# Patient Record
Sex: FEMALE | Race: WHITE | Employment: FULL TIME | URBAN - METROPOLITAN AREA
[De-identification: names, ages, dates, MRNs, and addresses within clinical notes are randomized per-mention and may not be internally consistent; named-entity substitution may affect disease eponyms.]

---

## 2021-01-18 ENCOUNTER — OFFICE VISIT (OUTPATIENT)
Dept: SURGERY | Age: 56
End: 2021-01-18
Payer: COMMERCIAL

## 2021-01-18 ENCOUNTER — DOCUMENTATION ONLY (OUTPATIENT)
Dept: SURGERY | Age: 56
End: 2021-01-18

## 2021-01-18 VITALS
TEMPERATURE: 97.6 F | HEART RATE: 75 BPM | DIASTOLIC BLOOD PRESSURE: 88 MMHG | SYSTOLIC BLOOD PRESSURE: 133 MMHG | HEIGHT: 68 IN | WEIGHT: 244 LBS | BODY MASS INDEX: 36.98 KG/M2

## 2021-01-18 DIAGNOSIS — C50.212 MALIGNANT NEOPLASM OF UPPER-INNER QUADRANT OF LEFT BREAST IN FEMALE, ESTROGEN RECEPTOR POSITIVE (HCC): Primary | ICD-10-CM

## 2021-01-18 DIAGNOSIS — Z17.0 MALIGNANT NEOPLASM OF UPPER-INNER QUADRANT OF LEFT BREAST IN FEMALE, ESTROGEN RECEPTOR POSITIVE (HCC): Primary | ICD-10-CM

## 2021-01-18 PROCEDURE — 99205 OFFICE O/P NEW HI 60 MIN: CPT | Performed by: SURGERY

## 2021-01-18 PROCEDURE — 76642 ULTRASOUND BREAST LIMITED: CPT | Performed by: SURGERY

## 2021-01-18 NOTE — PATIENT INSTRUCTIONS
Learning About Breast Cancer Surgery  What is breast cancer surgery? Surgery is a key part of treatment for breast cancer. The type of surgery you have depends on the size, location, and type of the cancer. It also depends on your health and what is important to you. Your doctor may combine treatments. This is a common way to treat breast cancer. You may have surgery to remove all the cancer that can be seen. After surgery you may also need radiation, chemotherapy, or hormone therapy. These treatments get rid of any cancer cells that may be left. During the surgery, the doctor may remove lymph nodes from the armpit. The lymph nodes will be looked at under a microscope. This is used to check if cancer has spread from the breast into the lymph nodes. What surgeries are used? Lumpectomy   Lumpectomy removes the tumor in the breast. The incision is made close to the tumor. This shows common places where the cut is made. Simple mastectomy   Simple mastectomy removes the whole breast, including nipple and skin. This shows where the cut is often made. Nipple-sparing mastectomy with inframammary cut   Nipple-sparing mastectomy removes the whole breast but leaves the skin and nipple. This shows the cut in the curve under the breast (inframammary). Nipple-sparing mastectomy with lateral cut   Nipple-sparing mastectomy removes the whole breast but leaves the skin and nipple. This shows the cut from the nipple along the side of the breast toward the armpit (lateral). Nipple-sparing mastectomy with periareolar cut   Nipple-sparing mastectomy removes the whole breast but leaves the skin and nipple. This shows the cut around the top of the nipple and along the side of the breast toward the armpit (periareolar).     Skin-sparing mastectomy with periareolar cut   Skin-sparing mastectomy removes the whole breast and the nipple, but it keeps the skin that covers the breast. This shows the cut around the nipple (periareolar). Skin-sparing mastectomy with teardrop cut   Skin-sparing mastectomy removes the whole breast and the nipple, but it keeps the skin that covers the breast. This shows the cut around the nipple in a teardrop shape. Skin-sparing mastectomy with tennis racket cut   Skin-sparing mastectomy removes the whole breast and the nipple, but it keeps the skin that covers the breast. This shows the cut around the nipple and along the side of the breast toward the armpit (tennis racket shape). What can you expect after surgery? The amount of time you will need to recover depends on the type of surgery you had. It also depends on whether you need any more treatment. If you had a lumpectomy, you will probably look the same in a bra. But your breasts may not match in size or shape after surgery. This depends on the size of your breasts and how much tissue was removed. If you had a mastectomy or had a lot of your breast removed in a lumpectomy, you may want to consider breast reconstruction. This is surgery to create a new breast. This may be done at the same time as your breast surgery. Or it may be done later. Some women choose not to do reconstruction at all. You choose what feels right for you. No matter what kind of surgery you have, you will get information about your treatment. This includes how to prepare, what to expect, and what to do afterward. Follow-up care is a key part of your treatment and safety. Be sure to make and go to all appointments, and call your doctor if you are having problems. It's also a good idea to know your test results and keep a list of the medicines you take. Where can you learn more? Go to http://www.[employer].com/  Enter P020 in the search box to learn more about \"Learning About Breast Cancer Surgery. \"  Current as of: April 29, 2020               Content Version: 12.6  © 0498-5409 [employer].    Care instructions adapted under license by [employer] (which disclaims liability or warranty for this information). If you have questions about a medical condition or this instruction, always ask your healthcare professional. [address] any warranty or liability for your use of this information. Learning About Breast Cancer Treatments  Your Care Instructions     Breast cancer means abnormal cells grow out of control in one or both breasts. These cancer cells can spread from the breast to nearby lymph nodes and other tissues. They can also spread to other parts of the body. The type and stage of cancer you have is based on:  · Where in the breast the cancer started. · The genetics of those cancer cells. · How far cancer has spread within the breast, to nearby tissues, or to other organs. · What the cancer cells look like under a microscope. · Whether there is cancer in the nearby lymph nodes. Tests that help find the stage of your cancer can help choose the right treatment for you. These tests can include a breast biopsy, lymph node biopsy, blood tests, and X-rays. You may need other tests as well, such as a bone, CT, or MRI scan. Whether you have more tests depends on your symptoms and the stage of the cancer. When you find out that you have cancer, you may feel many emotions and may need some help coping. Seek out family, friends, and counselors for support. You also can do things at home to make yourself feel better while you go through treatment. Call the [employer] [facility] (0-424.345.7688) or visit its website at [employer] for more information. How is breast cancer treated? Your doctor may combine treatments. This is a common way to treat breast cancer. Treatment depends on what type and stage of cancer you have. You may have:  · Surgery to remove the cancer. · Radiation. This uses high-dose X-rays to kill cancer cells and shrink tumors. · Chemotherapy.  This uses medicine to kill cancer cells.  · Hormone therapy. This uses medicines such as tamoxifen. It limits the effect of the hormone estrogen. This hormone can help some types of breast cancer cells to grow. · Targeted therapy. This uses medicines such as trastuzumab (Herceptin) to help your immune system fight the cancer. What surgeries are done for breast cancer? Surgery is a key part of treatment for breast cancer. The main types of surgeries are:  · Breast-conserving surgery, such as lumpectomy. It removes the cancer in the breast and just enough tissue to make sure that all of the cancer was removed. · Surgery to remove the breast. This includes:  ? Total mastectomy. This removes the whole breast.  ? Nipple-sparing mastectomy. This removes the whole breast but leaves the nipple. ? Modified radical mastectomy. This removes the whole breast and the lymph nodes under the arm (axillary lymph nodes). ? Radical mastectomy. This removes the whole breast, the chest muscles, and all the lymph nodes under the arm. If lymph nodes under the arm are removed, they are looked at under the microscope to check for cancer. There are two types of lymph node removal:  · [place] lymph node biopsy removes the lymph node that is the first to receive lymph fluid from the tumor. If cancer has spread from the breast to the lymph nodes, cancer cells most often show up in the sentinel node first.  · Axillary lymph node dissection removes most of the lymph nodes in the armpit. The type of surgery you have depends on the size, location, and type of the cancer. It also depends on your overall health and personal preferences. Even if your doctor removes all the cancer that can be seen at the time of your surgery, you may still need more treatment. You may get radiation, chemotherapy, or hormone therapy after surgery to try to kill any cancer cells that may be left.   No matter what kind of surgery you have, you will get information about why you are having it, what its risks are, how to prepare, and what to do after surgery. Follow-up care is a key part of your treatment and safety. Be sure to make and go to all appointments, and call your doctor if you are having problems. It's also a good idea to know your test results and keep a list of the medicines you take. Where can you learn more? Go to http://www.gray.com/  Enter X810 in the search box to learn more about \"Learning About Breast Cancer Treatments. \"  Current as of: April 29, 2020               Content Version: 12.6  © 8838-2924 [employer]. Care instructions adapted under license by [employer] (which disclaims liability or warranty for this information). If you have questions about a medical condition or this instruction, always ask your healthcare professional. [address] any warranty or liability for your use of this information.

## 2021-01-18 NOTE — PROGRESS NOTES
Brought outside breast imaging CDs ([phone] [facility]) up to [facility] [address] to be uploaded into pacs.

## 2021-01-18 NOTE — LETTER
1/19/2021 Patient: Chaitanya Barba YOB: 1965 Date of Visit: 1/18/2021 Suzie Tran MD 
[address] 
[address] [address] Via Fax: [phone] Dear Suzie Tran MD, Thank you for referring Ms. Marlena Rojas to [address] MAIN OFFICE [address] [place] [address] for evaluation. My notes for this consultation are attached. If you have questions, please do not hesitate to call me. I look forward to following your patient along with you. Sincerely, Zoe Feliz MD

## 2021-01-18 NOTE — PROGRESS NOTES
HISTORY OF PRESENT ILLNESS  Bay Jeff is a 60 y.o. female. HPI NEW Patient presents for consultation at the request of Dr. Daren Vargas for newly diagnosed LEFT breast cancer. This was detected from mammogram which led to diagnostic imaging and subsequent biopsies. She had 2 sites biopsied in left breast. One site showed cancer. History of left breast biopsies done in the past. All benign up until now. Some soreness in left breast.    Breast cancer-   - LEFT breast asymmetry 9 OC biopsy revealed invasive mammary carcinoma, ductal subtype. Also LEFT breast mass 9 OC 8 cmfn biopsy was benign adenosis. Family history-  Maternal grandmother had breast cancer in her 42's. Maternal aunt had breast cancer in her 42's. Sister - questionable breast cancer? Age 48  Niece breast breast cancer age 40  Paternal cousin had breast cancer age 61. Niece had ovarian cancer age 28. Mother had cervical cancer age 22.     Breast imaging-  2020 - left breast dx reyes and US done at [address]: BI-RADS 4 c   2020 - screening mammogram at [address]: BI-RADS 0  Past Medical History:   Diagnosis Date    Back pain     Chronic pain     BACK/JOINT PAIN    Constipation     Dyspepsia and other specified disorders of function of stomach     Gallstones 2014    GERD (gastroesophageal reflux disease)     INTERMITTANT    Joint pain     Nausea & vomiting      Past Surgical History:   Procedure Laterality Date    HX APPENDECTOMY  1985    HX CHOLECYSTECTOMY  5-15-14    [id number]         HX HEENT      tonsillectomy    HX ORTHOPAEDIC      meniscus tear right knee    HX OTHER SURGICAL      removal of cyst from head    DE BREAST SURGERY PROCEDURE UNLISTED      BREAST BIOPSIES     Social History     Socioeconomic History    Marital status:      Spouse name: Not on file    Number of children: Not on file    Years of education: Not on file    Highest education level: Not on file   Occupational History    Not on file   Social Needs    Financial resource strain: Not on file    Food insecurity     Worry: Not on file     Inability: Not on file    Transportation needs     Medical: Not on file     Non-medical: Not on file   Tobacco Use    Smoking status: Never Smoker    Smokeless tobacco: Never Used   Substance and Sexual Activity    Alcohol use: Yes     Comment: wine/beer 2 per month    Drug use: Not on file    Sexual activity: Not on file   Lifestyle    Physical activity     Days per week: Not on file     Minutes per session: Not on file    Stress: Not on file   Relationships    Social connections     Talks on phone: Not on file     Gets together: Not on file     Attends [employer] service: Not on file     Active member of club or organization: Not on file     Attends meetings of clubs or organizations: Not on file     Relationship status: Not on file    Intimate partner violence     Fear of current or ex partner: Not on file     Emotionally abused: Not on file     Physically abused: Not on file     Forced sexual activity: Not on file   Other Topics Concern    Not on file   Social History Narrative    Not on file     OB History    No obstetric history on file. Obstetric Comments   Menarche 11, LMP 2017, # of children 2, age of 4st delivery 19, Hysterectomy/oophorectomy NO/NO, Breast bx YES, history of breast feeding YES, BCP YES, Hormone therapy NO             Current Outpatient Medications:     estradiol-norethindrone (COMBIPATCH) 0.05-0.14 mg/24 hr, 1 Patch by TransDERmal route two (2) times a week., Disp: , Rfl:     meloxicam (MOBIC) 15 mg tablet, Take 15 mg by mouth daily. , Disp: , Rfl:     solifenacin (VESICARE) 5 mg tablet, Take 5 mg by mouth daily. , Disp: , Rfl:     ETONOGESTREL/ETHINYL ESTRADIOL (NUVARING VA), Insert  into vagina. , Disp: , Rfl:   No Known Allergies  Review of Systems   Constitutional: Negative. HENT: Negative.     Eyes: Negative. Respiratory: Negative. Cardiovascular: Negative. Gastrointestinal: Negative. Genitourinary: Negative. Musculoskeletal: Negative. Skin: Negative. Neurological: Negative. Endo/Heme/Allergies: Negative. Psychiatric/Behavioral: Negative. All other systems reviewed and are negative. Physical Exam  Vitals signs and nursing note reviewed. Constitutional:       Appearance: She is well-developed. HENT:      Head: Normocephalic. Neck:      Musculoskeletal: Neck supple. Thyroid: No thyromegaly. Cardiovascular:      Rate and Rhythm: Normal rate and regular rhythm. Heart sounds: Normal heart sounds. Pulmonary:      Effort: Pulmonary effort is normal.      Breath sounds: Normal breath sounds. Chest:      Breasts: Breasts are symmetrical.         Right: No inverted nipple, mass, nipple discharge, skin change or tenderness. Left: No inverted nipple, mass, nipple discharge, skin change or tenderness. Abdominal:      Palpations: Abdomen is soft. Musculoskeletal: Normal range of motion. Lymphadenopathy:      Cervical: No cervical adenopathy. Skin:     General: Skin is warm and dry. Neurological:      Mental Status: She is alert and oriented to person, place, and time. BREAST ULTRASOUND, Preop planning  Indication:preop planning  left Breast 9:00   Technique: The area was scanned using a high-frequency linear-array near-field transducer  Findings: clip not seen  Impression: Biopsy site not visible with ultrasound  Disposition:  Will schedule lumpectomy with sentinel lymph node biopsy after mri    ASSESSMENT and PLAN    ICD-10-CM ICD-9-CM    1. Malignant neoplasm of upper-inner quadrant of left breast in female, estrogen receptor positive (HCC)  C50.212 174.2 MRI BREAST BI W WO CONT    Z17.0 V86.0      59 yo female with left breast T1 N0 IDC receptors pending. She is here with her .    I have reviewed the imaging and pathology with her and she was given copies of these reports. 90 minutes were spent face-to-face with the patient during this encounter and 90% of that time was spent on counseling and coordination of care. 1. Discussed lumpectomy and radiation vs mastectomy. Discussed reconstruction. MRI ordered to see if patient is a candidate for a lumpectomy. Discussed reduction lumpectomy. Discussed magseed localization. 2. Discussed sentinel lymph node biopsy. 3. Discussed external beam radiation. 4. Discussed hormone therapy. 5. Discussed the possibility of chemotherapy. Will refer to plastics and check breast mri. She is interested in reduction lumpectomy. I will call her after mri. Will need magseed for localization.

## 2021-01-27 ENCOUNTER — HOSPITAL ENCOUNTER (OUTPATIENT)
Dept: MRI IMAGING | Age: 56
Discharge: HOME | End: 2021-01-27
Attending: SURGERY
Payer: COMMERCIAL

## 2021-01-27 DIAGNOSIS — C50.212 MALIGNANT NEOPLASM OF UPPER-INNER QUADRANT OF LEFT BREAST IN FEMALE, ESTROGEN RECEPTOR POSITIVE (HCC): ICD-10-CM

## 2021-01-27 DIAGNOSIS — Z17.0 MALIGNANT NEOPLASM OF UPPER-INNER QUADRANT OF LEFT BREAST IN FEMALE, ESTROGEN RECEPTOR POSITIVE (HCC): ICD-10-CM

## 2021-01-27 PROCEDURE — A9585 GADOBUTROL INJECTION: HCPCS | Performed by: SURGERY

## 2021-01-27 PROCEDURE — 74011250636 HC RX REV CODE- 250/636: Performed by: SURGERY

## 2021-01-27 PROCEDURE — 77049 MRI BREAST C-+ W/CAD BI: CPT

## 2021-01-27 RX ADMIN — GADOBUTROL 10 ML: 604.72 INJECTION INTRAVENOUS at 13:47

## 2021-01-28 NOTE — PROGRESS NOTES
I called patient with mri   Amenable to lumpectomy, sln biopsy   hasnt heard from dr. Kirill Montero office  Will have ho follow up wants reductions.

## 2021-01-29 ENCOUNTER — DOCUMENTATION ONLY (OUTPATIENT)
Dept: SURGERY | Age: 56
End: 2021-01-29

## 2021-01-29 NOTE — PROGRESS NOTES
I called Dr Kelly Last office on 01/25/2021 the appointment was not scheduled yet. I called again today and the  said the  would be calling me back today. I will try and call again before I leave today.

## 2021-02-02 DIAGNOSIS — C50.212 MALIGNANT NEOPLASM OF UPPER-INNER QUADRANT OF LEFT BREAST IN FEMALE, ESTROGEN RECEPTOR POSITIVE (HCC): Primary | ICD-10-CM

## 2021-02-02 DIAGNOSIS — Z17.0 MALIGNANT NEOPLASM OF UPPER-INNER QUADRANT OF LEFT BREAST IN FEMALE, ESTROGEN RECEPTOR POSITIVE (HCC): Primary | ICD-10-CM

## 2021-02-02 NOTE — PROGRESS NOTES
Dr Cecil Neil appointment is 02/10/2021 @ 4:30 pm  Patient has been called and has this information.

## 2021-02-08 ENCOUNTER — TELEPHONE (OUTPATIENT)
Dept: SURGERY | Age: 56
End: 2021-02-08

## 2021-02-08 NOTE — TELEPHONE ENCOUNTER
Called and spoke with patient regarding genetic testing results - clinically negative. She has appointment with plastic surgery this week. She asked about the STD form she sent via [address] I let her know that we received it.

## 2021-02-08 NOTE — TELEPHONE ENCOUNTER
Copying Fabrizio Dumont and Deep Tsang to this message because patient was asking about short term disability.   Thanks,  Franca Rose

## 2021-02-19 ENCOUNTER — DOCUMENTATION ONLY (OUTPATIENT)
Dept: SURGERY | Age: 56
End: 2021-02-19

## 2021-02-19 NOTE — PROGRESS NOTES
Spoke with Harvey Blanco in Dr Lai Humphreys office. Will sent request to Dr Johnna Cedillo for surgery order for this patient.

## 2021-03-11 ENCOUNTER — HOSPITAL ENCOUNTER (OUTPATIENT)
Dept: PREADMISSION TESTING | Age: 56
Discharge: HOME | End: 2021-03-11
Payer: COMMERCIAL

## 2021-03-11 VITALS
BODY MASS INDEX: 38.72 KG/M2 | SYSTOLIC BLOOD PRESSURE: 138 MMHG | HEIGHT: 68 IN | WEIGHT: 255.51 LBS | RESPIRATION RATE: 18 BRPM | TEMPERATURE: 98.2 F | DIASTOLIC BLOOD PRESSURE: 85 MMHG | HEART RATE: 87 BPM

## 2021-03-11 LAB
BASOPHILS # BLD: 0 K/UL (ref 0–0.1)
BASOPHILS NFR BLD: 0 % (ref 0–1)
DIFFERENTIAL METHOD BLD: NORMAL
EOSINOPHIL # BLD: 0.2 K/UL (ref 0–0.4)
EOSINOPHIL NFR BLD: 4 % (ref 0–7)
ERYTHROCYTE [DISTWIDTH] IN BLOOD BY AUTOMATED COUNT: 13.7 % (ref 11.5–14.5)
HCT VFR BLD AUTO: 36.5 % (ref 35–47)
HGB BLD-MCNC: 12.3 G/DL (ref 11.5–16)
IMM GRANULOCYTES # BLD AUTO: 0 K/UL (ref 0–0.04)
IMM GRANULOCYTES NFR BLD AUTO: 0 % (ref 0–0.5)
LYMPHOCYTES # BLD: 1.3 K/UL (ref 0.8–3.5)
LYMPHOCYTES NFR BLD: 24 % (ref 12–49)
MCH RBC QN AUTO: 30.4 PG (ref 26–34)
MCHC RBC AUTO-ENTMCNC: 33.7 G/DL (ref 30–36.5)
MCV RBC AUTO: 90.3 FL (ref 80–99)
MONOCYTES # BLD: 0.5 K/UL (ref 0–1)
MONOCYTES NFR BLD: 9 % (ref 5–13)
NEUTS SEG # BLD: 3.3 K/UL (ref 1.8–8)
NEUTS SEG NFR BLD: 63 % (ref 32–75)
NRBC # BLD: 0 K/UL (ref 0–0.01)
NRBC BLD-RTO: 0 PER 100 WBC
PLATELET # BLD AUTO: 245 K/UL (ref 150–400)
PMV BLD AUTO: 9.9 FL (ref 8.9–12.9)
RBC # BLD AUTO: 4.04 M/UL (ref 3.8–5.2)
WBC # BLD AUTO: 5.4 K/UL (ref 3.6–11)

## 2021-03-11 PROCEDURE — 85025 COMPLETE CBC W/AUTO DIFF WBC: CPT

## 2021-03-11 PROCEDURE — 36415 COLL VENOUS BLD VENIPUNCTURE: CPT

## 2021-03-11 RX ORDER — BISMUTH SUBSALICYLATE 262 MG
1 TABLET,CHEWABLE ORAL DAILY
COMMUNITY

## 2021-03-11 NOTE — PERIOP NOTES
PRE-OPERATIVE INSTRUCTIONS REVIEWED WITH PATIENT. PT GIVEN TIME TO ASK QUESTIONS   PATIENT GIVEN 2-BOTTLE OF CHG SOAP. REVIEWED. PATIENT GIVEN SSI INFECTION FAQ SHEET.     INSTRUCTIONS GIVEN AND REVIEWED ON ADMISSION PROCESS AND COVID PT UNDERSTANDS THE NEED TO SELF QUARANTINE UNTIL DOS

## 2021-03-17 ENCOUNTER — IMMUNIZATION (OUTPATIENT)
Dept: INTERNAL MEDICINE CLINIC | Age: 56
End: 2021-03-17
Payer: COMMERCIAL

## 2021-03-17 DIAGNOSIS — Z23 ENCOUNTER FOR IMMUNIZATION: Primary | ICD-10-CM

## 2021-03-17 PROCEDURE — 91300 COVID-19, MRNA, LNP-S, PF, 30MCG/0.3ML DOSE(PFIZER): CPT | Performed by: FAMILY MEDICINE

## 2021-03-17 PROCEDURE — 0001A COVID-19, MRNA, LNP-S, PF, 30MCG/0.3ML DOSE(PFIZER): CPT | Performed by: FAMILY MEDICINE

## 2021-03-23 DIAGNOSIS — C50.212 MALIGNANT NEOPLASM OF UPPER-INNER QUADRANT OF LEFT BREAST IN FEMALE, ESTROGEN RECEPTOR POSITIVE (HCC): Primary | ICD-10-CM

## 2021-03-23 DIAGNOSIS — Z17.0 MALIGNANT NEOPLASM OF UPPER-INNER QUADRANT OF LEFT BREAST IN FEMALE, ESTROGEN RECEPTOR POSITIVE (HCC): Primary | ICD-10-CM

## 2021-03-24 ENCOUNTER — HOSPITAL ENCOUNTER (OUTPATIENT)
Dept: MAMMOGRAPHY | Age: 56
Discharge: HOME | End: 2021-03-24
Attending: SURGERY
Payer: COMMERCIAL

## 2021-03-24 DIAGNOSIS — R92.8 ABNORMAL MAMMOGRAM: ICD-10-CM

## 2021-03-24 PROCEDURE — A4648 IMPLANTABLE TISSUE MARKER: HCPCS

## 2021-03-24 PROCEDURE — 74011000250 HC RX REV CODE- 250: Performed by: RADIOLOGY

## 2021-03-24 RX ORDER — LIDOCAINE HYDROCHLORIDE 10 MG/ML
5 INJECTION INFILTRATION; PERINEURAL
Status: COMPLETED | OUTPATIENT
Start: 2021-03-24 | End: 2021-03-24

## 2021-03-24 RX ADMIN — LIDOCAINE HYDROCHLORIDE 5 ML: 10 INJECTION, SOLUTION INFILTRATION; PERINEURAL at 11:50

## 2021-03-24 NOTE — PROGRESS NOTES
Patient tolerated left breast mag seed placement well with scant bleeding. Site was covered with a gauze and tegaderm. Discharge instructions were reviewed with the patient and she was provided with a written copy as well. Encouraged her to call with any questions or concerns.

## 2021-03-25 NOTE — PROGRESS NOTES
[facility] [phone] [facility]. I gave a copy to the  to scan into patient's chart, along with her return to work form, to be sent at a later date.

## 2021-03-26 ENCOUNTER — HOSPITAL ENCOUNTER (OUTPATIENT)
Dept: PREADMISSION TESTING | Age: 56
Discharge: HOME | End: 2021-03-26
Payer: COMMERCIAL

## 2021-03-26 ENCOUNTER — TRANSCRIBE ORDER (OUTPATIENT)
Dept: REGISTRATION | Age: 56
End: 2021-03-26

## 2021-03-26 DIAGNOSIS — C50.212 MALIGNANT NEOPLASM OF UPPER-INNER QUADRANT OF LEFT BREAST IN FEMALE, ESTROGEN RECEPTOR POSITIVE (HCC): ICD-10-CM

## 2021-03-26 DIAGNOSIS — Z01.812 PRE-PROCEDURE LAB EXAM: Primary | ICD-10-CM

## 2021-03-26 DIAGNOSIS — Z17.0 MALIGNANT NEOPLASM OF UPPER-INNER QUADRANT OF LEFT BREAST IN FEMALE, ESTROGEN RECEPTOR POSITIVE (HCC): ICD-10-CM

## 2021-03-26 PROCEDURE — U0003 INFECTIOUS AGENT DETECTION BY NUCLEIC ACID (DNA OR RNA); SEVERE ACUTE RESPIRATORY SYNDROME CORONAVIRUS 2 (SARS-COV-2) (CORONAVIRUS DISEASE [COVID-19]), AMPLIFIED PROBE TECHNIQUE, MAKING USE OF HIGH THROUGHPUT TECHNOLOGIES AS DESCRIBED BY CMS-2020-01-R: HCPCS

## 2021-03-27 LAB — SARS-COV-2, COV2NT: NOT DETECTED

## 2021-03-29 ENCOUNTER — ANESTHESIA EVENT (OUTPATIENT)
Dept: MEDSURG UNIT | Age: 56
End: 2021-03-29
Payer: COMMERCIAL

## 2021-03-30 ENCOUNTER — APPOINTMENT (OUTPATIENT)
Dept: NUCLEAR MEDICINE | Age: 56
End: 2021-03-30
Attending: SURGERY
Payer: COMMERCIAL

## 2021-03-30 ENCOUNTER — APPOINTMENT (OUTPATIENT)
Dept: MAMMOGRAPHY | Age: 56
End: 2021-03-30
Attending: SURGERY
Payer: COMMERCIAL

## 2021-03-30 ENCOUNTER — ANESTHESIA (OUTPATIENT)
Dept: MEDSURG UNIT | Age: 56
End: 2021-03-30
Payer: COMMERCIAL

## 2021-03-30 ENCOUNTER — HOSPITAL ENCOUNTER (OUTPATIENT)
Age: 56
Setting detail: OUTPATIENT SURGERY
Discharge: HOME | End: 2021-03-30
Attending: SURGERY | Admitting: SURGERY
Payer: COMMERCIAL

## 2021-03-30 VITALS
WEIGHT: 249 LBS | HEART RATE: 94 BPM | DIASTOLIC BLOOD PRESSURE: 83 MMHG | BODY MASS INDEX: 37.74 KG/M2 | OXYGEN SATURATION: 93 % | RESPIRATION RATE: 16 BRPM | HEIGHT: 68 IN | TEMPERATURE: 98.3 F | SYSTOLIC BLOOD PRESSURE: 136 MMHG

## 2021-03-30 DIAGNOSIS — C50.212 MALIGNANT NEOPLASM OF UPPER-INNER QUADRANT OF LEFT BREAST IN FEMALE, ESTROGEN RECEPTOR POSITIVE (HCC): ICD-10-CM

## 2021-03-30 DIAGNOSIS — Z17.0 MALIGNANT NEOPLASM OF UPPER-INNER QUADRANT OF LEFT BREAST IN FEMALE, ESTROGEN RECEPTOR POSITIVE (HCC): ICD-10-CM

## 2021-03-30 PROCEDURE — 77030026438 HC STYL ET INTUB CARD -A: Performed by: REGISTERED NURSE

## 2021-03-30 PROCEDURE — 2709999900 HC NON-CHARGEABLE SUPPLY: Performed by: SURGERY

## 2021-03-30 PROCEDURE — 88305 TISSUE EXAM BY PATHOLOGIST: CPT

## 2021-03-30 PROCEDURE — 77030034556 HC PRB MARGN DETECT LUMPECTMY DISP DUNE -G: Performed by: SURGERY

## 2021-03-30 PROCEDURE — 77030041680 HC PNCL ELECSURG SMK EVAC CNMD -B: Performed by: SURGERY

## 2021-03-30 PROCEDURE — 88307 TISSUE EXAM BY PATHOLOGIST: CPT

## 2021-03-30 PROCEDURE — 76060000066 HC AMB SURG ANES 3 TO 3.5 HR: Performed by: SURGERY

## 2021-03-30 PROCEDURE — 74011250636 HC RX REV CODE- 250/636: Performed by: REGISTERED NURSE

## 2021-03-30 PROCEDURE — 76030000006 HC AMB SURG OR TIME 3 TO 3.5: Performed by: SURGERY

## 2021-03-30 PROCEDURE — 77030034626 HC LIGASURE SM JAW SEAL OPN SURG COVD -E: Performed by: SURGERY

## 2021-03-30 PROCEDURE — 76210000037 HC AMBSU PH I REC 2 TO 2.5 HR: Performed by: SURGERY

## 2021-03-30 PROCEDURE — 74011000250 HC RX REV CODE- 250: Performed by: REGISTERED NURSE

## 2021-03-30 PROCEDURE — 77030010507 HC ADH SKN DERMBND J&J -B: Performed by: SURGERY

## 2021-03-30 PROCEDURE — 74011250637 HC RX REV CODE- 250/637: Performed by: ANESTHESIOLOGY

## 2021-03-30 PROCEDURE — 77030040506 HC DRN WND MDII -A: Performed by: SURGERY

## 2021-03-30 PROCEDURE — 77030040361 HC SLV COMPR DVT MDII -B: Performed by: SURGERY

## 2021-03-30 PROCEDURE — 77030038213 HC SUPP BRA COMPRSS PSTOP COND -B: Performed by: SURGERY

## 2021-03-30 PROCEDURE — 77030041528 HC RETCTR RADLUX LGHTD MEDT -C: Performed by: SURGERY

## 2021-03-30 PROCEDURE — 74011000258 HC RX REV CODE- 258: Performed by: REGISTERED NURSE

## 2021-03-30 PROCEDURE — 74011000250 HC RX REV CODE- 250: Performed by: SURGERY

## 2021-03-30 PROCEDURE — 74011000250 HC RX REV CODE- 250: Performed by: PLASTIC SURGERY

## 2021-03-30 PROCEDURE — 19301 PARTIAL MASTECTOMY: CPT | Performed by: SURGERY

## 2021-03-30 PROCEDURE — 77030002966 HC SUT PDS J&J -A: Performed by: SURGERY

## 2021-03-30 PROCEDURE — 77030011267 HC ELECTRD BLD COVD -A: Performed by: SURGERY

## 2021-03-30 PROCEDURE — 38525 BIOPSY/REMOVAL LYMPH NODES: CPT | Performed by: SURGERY

## 2021-03-30 PROCEDURE — 77030008462 HC STPLR SKN PROX J&J -A: Performed by: SURGERY

## 2021-03-30 PROCEDURE — 77030012407 HC DRN WND BARD -B: Performed by: SURGERY

## 2021-03-30 PROCEDURE — 77030019702 HC WRP THER MENM -C: Performed by: SURGERY

## 2021-03-30 PROCEDURE — 74011250636 HC RX REV CODE- 250/636: Performed by: PLASTIC SURGERY

## 2021-03-30 PROCEDURE — 78195 LYMPH SYSTEM IMAGING: CPT

## 2021-03-30 PROCEDURE — 74011250636 HC RX REV CODE- 250/636: Performed by: ANESTHESIOLOGY

## 2021-03-30 PROCEDURE — 77030002996 HC SUT SLK J&J -A: Performed by: SURGERY

## 2021-03-30 PROCEDURE — 77030002933 HC SUT MCRYL J&J -A: Performed by: SURGERY

## 2021-03-30 PROCEDURE — 77030008684 HC TU ET CUF COVD -B: Performed by: REGISTERED NURSE

## 2021-03-30 RX ORDER — ONDANSETRON 2 MG/ML
4 INJECTION INTRAMUSCULAR; INTRAVENOUS AS NEEDED
Status: DISCONTINUED | OUTPATIENT
Start: 2021-03-30 | End: 2021-03-30 | Stop reason: HOSPADM

## 2021-03-30 RX ORDER — ROCURONIUM BROMIDE 10 MG/ML
INJECTION, SOLUTION INTRAVENOUS AS NEEDED
Status: DISCONTINUED | OUTPATIENT
Start: 2021-03-30 | End: 2021-03-30 | Stop reason: HOSPADM

## 2021-03-30 RX ORDER — HYDROMORPHONE HYDROCHLORIDE 1 MG/ML
0.2 INJECTION, SOLUTION INTRAMUSCULAR; INTRAVENOUS; SUBCUTANEOUS
Status: DISCONTINUED | OUTPATIENT
Start: 2021-03-30 | End: 2021-03-30 | Stop reason: HOSPADM

## 2021-03-30 RX ORDER — GLYCOPYRROLATE 0.2 MG/ML
INJECTION INTRAMUSCULAR; INTRAVENOUS AS NEEDED
Status: DISCONTINUED | OUTPATIENT
Start: 2021-03-30 | End: 2021-03-30 | Stop reason: HOSPADM

## 2021-03-30 RX ORDER — LIDOCAINE HYDROCHLORIDE 10 MG/ML
0.1 INJECTION, SOLUTION EPIDURAL; INFILTRATION; INTRACAUDAL; PERINEURAL AS NEEDED
Status: DISCONTINUED | OUTPATIENT
Start: 2021-03-30 | End: 2021-03-30 | Stop reason: HOSPADM

## 2021-03-30 RX ORDER — EPHEDRINE SULFATE/0.9% NACL/PF 50 MG/5 ML
SYRINGE (ML) INTRAVENOUS AS NEEDED
Status: DISCONTINUED | OUTPATIENT
Start: 2021-03-30 | End: 2021-03-30 | Stop reason: HOSPADM

## 2021-03-30 RX ORDER — BUPIVACAINE HYDROCHLORIDE 2.5 MG/ML
INJECTION, SOLUTION EPIDURAL; INFILTRATION; INTRACAUDAL AS NEEDED
Status: DISCONTINUED | OUTPATIENT
Start: 2021-03-30 | End: 2021-03-30 | Stop reason: HOSPADM

## 2021-03-30 RX ORDER — LIDOCAINE HYDROCHLORIDE 20 MG/ML
INJECTION, SOLUTION EPIDURAL; INFILTRATION; INTRACAUDAL; PERINEURAL AS NEEDED
Status: DISCONTINUED | OUTPATIENT
Start: 2021-03-30 | End: 2021-03-30 | Stop reason: HOSPADM

## 2021-03-30 RX ORDER — ONDANSETRON 2 MG/ML
INJECTION INTRAMUSCULAR; INTRAVENOUS AS NEEDED
Status: DISCONTINUED | OUTPATIENT
Start: 2021-03-30 | End: 2021-03-30 | Stop reason: HOSPADM

## 2021-03-30 RX ORDER — CEFAZOLIN SODIUM 1 G/3ML
INJECTION, POWDER, FOR SOLUTION INTRAMUSCULAR; INTRAVENOUS AS NEEDED
Status: DISCONTINUED | OUTPATIENT
Start: 2021-03-30 | End: 2021-03-30 | Stop reason: HOSPADM

## 2021-03-30 RX ORDER — CEPHALEXIN 250 MG/1
500 CAPSULE ORAL 3 TIMES DAILY
Qty: 42 CAP | Refills: 0 | Status: SHIPPED | OUTPATIENT
Start: 2021-03-30 | End: 2021-04-06

## 2021-03-30 RX ORDER — SODIUM CHLORIDE 0.9 % (FLUSH) 0.9 %
5-40 SYRINGE (ML) INJECTION AS NEEDED
Status: DISCONTINUED | OUTPATIENT
Start: 2021-03-30 | End: 2021-03-30 | Stop reason: HOSPADM

## 2021-03-30 RX ORDER — SODIUM CHLORIDE 0.9 % (FLUSH) 0.9 %
5-40 SYRINGE (ML) INJECTION EVERY 8 HOURS
Status: DISCONTINUED | OUTPATIENT
Start: 2021-03-30 | End: 2021-03-30 | Stop reason: HOSPADM

## 2021-03-30 RX ORDER — HYDROMORPHONE HYDROCHLORIDE 2 MG/ML
INJECTION, SOLUTION INTRAMUSCULAR; INTRAVENOUS; SUBCUTANEOUS AS NEEDED
Status: DISCONTINUED | OUTPATIENT
Start: 2021-03-30 | End: 2021-03-30 | Stop reason: HOSPADM

## 2021-03-30 RX ORDER — OXYCODONE AND ACETAMINOPHEN 5; 325 MG/1; MG/1
1-2 TABLET ORAL
Qty: 40 TAB | Refills: 0 | Status: SHIPPED | OUTPATIENT
Start: 2021-03-30 | End: 2021-04-02

## 2021-03-30 RX ORDER — SUCCINYLCHOLINE CHLORIDE 20 MG/ML
INJECTION INTRAMUSCULAR; INTRAVENOUS AS NEEDED
Status: DISCONTINUED | OUTPATIENT
Start: 2021-03-30 | End: 2021-03-30 | Stop reason: HOSPADM

## 2021-03-30 RX ORDER — SODIUM CHLORIDE, SODIUM LACTATE, POTASSIUM CHLORIDE, CALCIUM CHLORIDE 600; 310; 30; 20 MG/100ML; MG/100ML; MG/100ML; MG/100ML
INJECTION, SOLUTION INTRAVENOUS
Status: DISCONTINUED | OUTPATIENT
Start: 2021-03-30 | End: 2021-03-30 | Stop reason: HOSPADM

## 2021-03-30 RX ORDER — MIDAZOLAM HYDROCHLORIDE 1 MG/ML
INJECTION, SOLUTION INTRAMUSCULAR; INTRAVENOUS AS NEEDED
Status: DISCONTINUED | OUTPATIENT
Start: 2021-03-30 | End: 2021-03-30 | Stop reason: HOSPADM

## 2021-03-30 RX ORDER — MORPHINE SULFATE 2 MG/ML
2 INJECTION, SOLUTION INTRAMUSCULAR; INTRAVENOUS
Status: DISCONTINUED | OUTPATIENT
Start: 2021-03-30 | End: 2021-03-30 | Stop reason: HOSPADM

## 2021-03-30 RX ORDER — SCOLOPAMINE TRANSDERMAL SYSTEM 1 MG/1
1 PATCH, EXTENDED RELEASE TRANSDERMAL
Status: DISCONTINUED | OUTPATIENT
Start: 2021-03-30 | End: 2021-03-30 | Stop reason: HOSPADM

## 2021-03-30 RX ORDER — OXYCODONE HYDROCHLORIDE 5 MG/1
5 TABLET ORAL
Status: COMPLETED | OUTPATIENT
Start: 2021-03-30 | End: 2021-03-30

## 2021-03-30 RX ORDER — SCOLOPAMINE TRANSDERMAL SYSTEM 1 MG/1
1 PATCH, EXTENDED RELEASE TRANSDERMAL ONCE
Status: DISCONTINUED | OUTPATIENT
Start: 2021-03-30 | End: 2021-03-30 | Stop reason: HOSPADM

## 2021-03-30 RX ORDER — PHENYLEPHRINE HCL IN 0.9% NACL 0.4MG/10ML
SYRINGE (ML) INTRAVENOUS AS NEEDED
Status: DISCONTINUED | OUTPATIENT
Start: 2021-03-30 | End: 2021-03-30 | Stop reason: HOSPADM

## 2021-03-30 RX ORDER — DEXAMETHASONE SODIUM PHOSPHATE 4 MG/ML
INJECTION, SOLUTION INTRA-ARTICULAR; INTRALESIONAL; INTRAMUSCULAR; INTRAVENOUS; SOFT TISSUE AS NEEDED
Status: DISCONTINUED | OUTPATIENT
Start: 2021-03-30 | End: 2021-03-30 | Stop reason: HOSPADM

## 2021-03-30 RX ORDER — SODIUM CHLORIDE, SODIUM LACTATE, POTASSIUM CHLORIDE, CALCIUM CHLORIDE 600; 310; 30; 20 MG/100ML; MG/100ML; MG/100ML; MG/100ML
50 INJECTION, SOLUTION INTRAVENOUS CONTINUOUS
Status: DISCONTINUED | OUTPATIENT
Start: 2021-03-30 | End: 2021-03-30 | Stop reason: HOSPADM

## 2021-03-30 RX ORDER — PROPOFOL 10 MG/ML
INJECTION, EMULSION INTRAVENOUS AS NEEDED
Status: DISCONTINUED | OUTPATIENT
Start: 2021-03-30 | End: 2021-03-30 | Stop reason: HOSPADM

## 2021-03-30 RX ORDER — FENTANYL CITRATE 50 UG/ML
25 INJECTION, SOLUTION INTRAMUSCULAR; INTRAVENOUS
Status: DISCONTINUED | OUTPATIENT
Start: 2021-03-30 | End: 2021-03-30 | Stop reason: HOSPADM

## 2021-03-30 RX ORDER — ACETAMINOPHEN 500 MG
1000 TABLET ORAL ONCE
Status: COMPLETED | OUTPATIENT
Start: 2021-03-30 | End: 2021-03-30

## 2021-03-30 RX ORDER — NEOSTIGMINE METHYLSULFATE 1 MG/ML
INJECTION, SOLUTION INTRAVENOUS AS NEEDED
Status: DISCONTINUED | OUTPATIENT
Start: 2021-03-30 | End: 2021-03-30 | Stop reason: HOSPADM

## 2021-03-30 RX ORDER — FENTANYL CITRATE 50 UG/ML
INJECTION, SOLUTION INTRAMUSCULAR; INTRAVENOUS AS NEEDED
Status: DISCONTINUED | OUTPATIENT
Start: 2021-03-30 | End: 2021-03-30 | Stop reason: HOSPADM

## 2021-03-30 RX ORDER — MIDAZOLAM HYDROCHLORIDE 1 MG/ML
0.5 INJECTION, SOLUTION INTRAMUSCULAR; INTRAVENOUS
Status: DISCONTINUED | OUTPATIENT
Start: 2021-03-30 | End: 2021-03-30 | Stop reason: HOSPADM

## 2021-03-30 RX ADMIN — Medication 40 MCG: at 12:43

## 2021-03-30 RX ADMIN — PROPOFOL 50 MG: 10 INJECTION, EMULSION INTRAVENOUS at 11:27

## 2021-03-30 RX ADMIN — FENTANYL CITRATE 25 MCG: 50 INJECTION, SOLUTION INTRAMUSCULAR; INTRAVENOUS at 14:56

## 2021-03-30 RX ADMIN — CEFAZOLIN 2 G: 330 INJECTION, POWDER, FOR SOLUTION INTRAMUSCULAR; INTRAVENOUS at 11:02

## 2021-03-30 RX ADMIN — Medication 5 MG: at 13:20

## 2021-03-30 RX ADMIN — Medication 80 MCG: at 12:45

## 2021-03-30 RX ADMIN — FENTANYL CITRATE 25 MCG: 50 INJECTION, SOLUTION INTRAMUSCULAR; INTRAVENOUS at 12:32

## 2021-03-30 RX ADMIN — ROCURONIUM BROMIDE 5 MG: 10 SOLUTION INTRAVENOUS at 10:45

## 2021-03-30 RX ADMIN — SODIUM CHLORIDE, POTASSIUM CHLORIDE, SODIUM LACTATE AND CALCIUM CHLORIDE: 600; 310; 30; 20 INJECTION, SOLUTION INTRAVENOUS at 10:38

## 2021-03-30 RX ADMIN — ONDANSETRON HYDROCHLORIDE 4 MG: 2 INJECTION, SOLUTION INTRAMUSCULAR; INTRAVENOUS at 13:09

## 2021-03-30 RX ADMIN — ROCURONIUM BROMIDE 10 MG: 10 SOLUTION INTRAVENOUS at 12:11

## 2021-03-30 RX ADMIN — PROPOFOL 30 MG: 10 INJECTION, EMULSION INTRAVENOUS at 13:39

## 2021-03-30 RX ADMIN — Medication 40 MCG: at 12:42

## 2021-03-30 RX ADMIN — LIDOCAINE HYDROCHLORIDE 100 MG: 20 INJECTION, SOLUTION EPIDURAL; INFILTRATION; INTRACAUDAL; PERINEURAL at 10:45

## 2021-03-30 RX ADMIN — ROCURONIUM BROMIDE 25 MG: 10 SOLUTION INTRAVENOUS at 11:45

## 2021-03-30 RX ADMIN — Medication 3 MG: at 13:22

## 2021-03-30 RX ADMIN — Medication 5 MG: at 11:36

## 2021-03-30 RX ADMIN — FENTANYL CITRATE 50 MCG: 50 INJECTION, SOLUTION INTRAMUSCULAR; INTRAVENOUS at 10:55

## 2021-03-30 RX ADMIN — ACETAMINOPHEN 975 MG: 325 TABLET ORAL at 09:16

## 2021-03-30 RX ADMIN — FENTANYL CITRATE 25 MCG: 50 INJECTION, SOLUTION INTRAMUSCULAR; INTRAVENOUS at 11:27

## 2021-03-30 RX ADMIN — MIDAZOLAM 2 MG: 1 INJECTION INTRAMUSCULAR; INTRAVENOUS at 10:38

## 2021-03-30 RX ADMIN — Medication 10 MG: at 13:01

## 2021-03-30 RX ADMIN — Medication 80 MCG: at 11:36

## 2021-03-30 RX ADMIN — PROPOFOL 70 MG: 10 INJECTION, EMULSION INTRAVENOUS at 10:55

## 2021-03-30 RX ADMIN — SODIUM CHLORIDE, POTASSIUM CHLORIDE, SODIUM LACTATE AND CALCIUM CHLORIDE 50 ML/HR: 600; 310; 30; 20 INJECTION, SOLUTION INTRAVENOUS at 09:16

## 2021-03-30 RX ADMIN — SODIUM CHLORIDE 4 MCG: 900 INJECTION, SOLUTION INTRAVENOUS at 11:29

## 2021-03-30 RX ADMIN — SUCCINYLCHOLINE CHLORIDE 160 MG: 20 INJECTION, SOLUTION INTRAMUSCULAR; INTRAVENOUS at 10:45

## 2021-03-30 RX ADMIN — FENTANYL CITRATE 25 MCG: 50 INJECTION, SOLUTION INTRAMUSCULAR; INTRAVENOUS at 11:16

## 2021-03-30 RX ADMIN — DEXAMETHASONE SODIUM PHOSPHATE 8 MG: 4 INJECTION, SOLUTION INTRAMUSCULAR; INTRAVENOUS at 11:12

## 2021-03-30 RX ADMIN — GLYCOPYRROLATE 0.4 MG: 0.2 INJECTION, SOLUTION INTRAMUSCULAR; INTRAVENOUS at 13:22

## 2021-03-30 RX ADMIN — HYDROMORPHONE HYDROCHLORIDE 0.4 MG: 2 INJECTION, SOLUTION INTRAMUSCULAR; INTRAVENOUS; SUBCUTANEOUS at 13:50

## 2021-03-30 RX ADMIN — PROPOFOL 150 MG: 10 INJECTION, EMULSION INTRAVENOUS at 10:45

## 2021-03-30 RX ADMIN — PROCHLORPERAZINE EDISYLATE 5 MG: 5 INJECTION INTRAMUSCULAR; INTRAVENOUS at 15:51

## 2021-03-30 RX ADMIN — PROPOFOL 80 MG: 10 INJECTION, EMULSION INTRAVENOUS at 11:11

## 2021-03-30 RX ADMIN — SODIUM CHLORIDE, POTASSIUM CHLORIDE, SODIUM LACTATE AND CALCIUM CHLORIDE: 600; 310; 30; 20 INJECTION, SOLUTION INTRAVENOUS at 13:44

## 2021-03-30 RX ADMIN — OXYCODONE 5 MG: 5 TABLET ORAL at 16:06

## 2021-03-30 RX ADMIN — SODIUM CHLORIDE 4 MCG: 900 INJECTION, SOLUTION INTRAVENOUS at 12:00

## 2021-03-30 RX ADMIN — Medication 80 MCG: at 12:56

## 2021-03-30 RX ADMIN — SODIUM CHLORIDE 4 MCG: 900 INJECTION, SOLUTION INTRAVENOUS at 11:41

## 2021-03-30 RX ADMIN — SODIUM CHLORIDE 2 MCG: 900 INJECTION, SOLUTION INTRAVENOUS at 11:49

## 2021-03-30 RX ADMIN — FENTANYL CITRATE 25 MCG: 50 INJECTION, SOLUTION INTRAMUSCULAR; INTRAVENOUS at 11:45

## 2021-03-30 RX ADMIN — PROPOFOL 50 MG: 10 INJECTION, EMULSION INTRAVENOUS at 11:45

## 2021-03-30 RX ADMIN — ROCURONIUM BROMIDE 10 MG: 10 SOLUTION INTRAVENOUS at 12:20

## 2021-03-30 RX ADMIN — FENTANYL CITRATE 50 MCG: 50 INJECTION, SOLUTION INTRAMUSCULAR; INTRAVENOUS at 10:45

## 2021-03-30 NOTE — ANESTHESIA POSTPROCEDURE EVALUATION
Post-Anesthesia Evaluation and Assessment    Patient: Latha Grullon MRN: [id number]  SSN: [id number]    YOB: 1965  Age: 55 y.o.  Sex: female      I have evaluated the patient and they are stable and ready for discharge from the PACU.     Cardiovascular Function/Vital Signs  Visit Vitals  /80   Pulse 79   Temp 36.8 °C (98.3 °F)   Resp 11   Ht 5' 8\" (1.727 m)   Wt 112.9 kg (249 lb)   SpO2 96%   BMI 37.86 kg/m²       Patient is status post General anesthesia for Procedure(s):  LEFT BREAST LUMPECTOMY AND LEFT BREAST MAGSEED  LEFT AXILLARY SENTINEL NODE BIOPSY  LEFT BREAST RECONSTRUCION WITH BILATERAL BREAST REDUCTION..    Nausea/Vomiting: None    Postoperative hydration reviewed and adequate.    Pain:  Pain Scale 1: Visual (03/30/21 1406)  Pain Intensity 1: 0 (03/30/21 1406)   Managed    Neurological Status:   Neuro (WDL): Exceptions to WDL (03/30/21 1406)  Neuro  Neurologic State: Anesthetized (03/30/21 1406)   At baseline    Mental Status, Level of Consciousness: Alert and  oriented to person, place, and time    Pulmonary Status:   O2 Device: Room air (03/30/21 0848)   Adequate oxygenation and airway patent    Complications related to anesthesia: None    Post-anesthesia assessment completed. No concerns    Signed By: Zia Medeiros MD     March 30, 2021              Procedure(s):  LEFT BREAST LUMPECTOMY AND LEFT BREAST MAGSEED  LEFT AXILLARY SENTINEL NODE BIOPSY  LEFT BREAST RECONSTRUCION WITH BILATERAL BREAST REDUCTION..    general    <BSHSIANPOST>    INITIAL Post-op Vital signs:   Vitals Value Taken Time   /80 03/30/21 1430   Temp 36.8 °C (98.3 °F) 03/30/21 1415   Pulse 88 03/30/21 1441   Resp 11 03/30/21 1441   SpO2 96 % 03/30/21 1441   Vitals shown include unvalidated device data.

## 2021-03-30 NOTE — ANESTHESIA PREPROCEDURE EVALUATION
Relevant Problems   No relevant active problems       Anesthetic History     PONV          Review of Systems / Medical History  Patient summary reviewed, nursing notes reviewed and pertinent labs reviewed    Pulmonary  Within defined limits                 Neuro/Psych   Within defined limits           Cardiovascular  Within defined limits                Exercise tolerance: >4 METS     GI/Hepatic/Renal     GERD: well controlled           Endo/Other  Within defined limits           Other Findings              Physical Exam    Airway  Mallampati: II  TM Distance: > 6 cm  Neck ROM: normal range of motion   Mouth opening: Normal     Cardiovascular  Regular rate and rhythm,  S1 and S2 normal,  no murmur, click, rub, or gallop             Dental  No notable dental hx       Pulmonary  Breath sounds clear to auscultation               Abdominal  GI exam deferred       Other Findings            Anesthetic Plan    ASA: 2  Anesthesia type: general          Induction: Intravenous  Anesthetic plan and risks discussed with: Patient

## 2021-03-30 NOTE — OP NOTES
[address]   OPERATIVE REPORT    Name:  London Paredes  MR#:  [id number]  :  1965  ACCOUNT #:  [de-identified]  DATE OF SERVICE:  2021      PREOPERATIVE DIAGNOSIS:  Left breast cancer, lower inner quadrant. POSTOPERATIVE DIAGNOSIS:  Left breast cancer, lower inner quadrant. PROCEDURE PERFORMED:  Left breast Magseed-guided lumpectomy, left deep axillary sentinel lymph node biopsy, intraoperative margin assessment using RF spectroscopy. SURGEON:  Maddie Loaiza MD    ASSISTANT:  Nery Kincaid MD    ANESTHESIA:  General.    COMPLICATIONS:  None. SPECIMENS REMOVED:  1. Left axillary sentinel node #1.  2.  Left axillary sentinel node #2. 3.  Left axillary sentinel node #3. 4.  Left breast lumpectomy. 5.  Superior margin. 6.  Medial margin. 7.  Right breast tissue. 8.  Left breast tissue. IMPLANTS:  None. ESTIMATED BLOOD LOSS:  Minimal.    DRAINS:  No drains for my part of the procedure. FINDINGS:  Three sentinel lymph nodes removed and sent for permanent pathology. INDICATIONS FOR PROCEDURE:  A 40-year-old female with lower inner quadrant left breast cancer. She wished to have reduction lumpectomy, deep axillary sentinel node biopsy, and her reduction is by Dr. Beverly Rossi. PROCEDURE IN DETAIL:  The patient initially went to Nuclear Medicine where technetium-99 was injected into the left breast.  She tolerated this well. She also had a Magseed placed for clip identification in the left breast and she tolerated this well. She was taken to the preoperative holding area. Surgical site was marked by both surgeons and informed consent was obtained. She was taken to the operating room, laid in supine position where general endotracheal anesthesia was induced. Bilateral breasts were prepped and draped in usual fashion and time-out was performed.   Attention was turned to the left axilla where an inferior axillary hairline incision was made with a 10-blade. Bovie cautery was used to dissect through the axillary fascia. Three sentinel nodes were identified using Neoprobe guidance, excised with LigaSure. These were sent for permanent pathology. Of note, these were normal in gross size and appearance. Next, the cavity was irrigated. 10 mL of local anesthetic was injected into the left axilla. The axillary fascia was closed with interrupted 3-0 Vicryl and the skin with 4-0 subcuticular Monocryl. Attention was turned to the left breast, and using the medial portion of the reduction pattern incision, 10-blade was used to make an incision here. The Magseed probe was calibrated and used to identify where the [facility] was. This was excised with Bovie cautery and marked short superior, long stitch lateral.  The MarginProbe device was plugged and calibrated. All six margins were assessed. For additional margins, please see above. Total intraoperative time was 2 minutes. Next, the specimen was weighed and sent to [facility] for clip confirmation, which confirmed clip in specimen and then sent for permanent pathology. A moist lap was packed in the cavity in preparation for Dr. Samina Ngo part of the procedure. She was present in my portion of the procedure with me assisting me. The patient was stable during this time.         Milvia Doe MD      [id number]  D:  03/30/2021 12:37  T:  03/30/2021 14:01  JOB #:  [id number]

## 2021-03-30 NOTE — PERIOP NOTES
I have reviewed discharge instructions with the patient and spouse. The patient and spouse verbalized understanding. All questions addressed at this time. A paper copy of these instructions have been given to the patient to take home.

## 2021-03-30 NOTE — DISCHARGE INSTRUCTIONS
[facility]. MD Jayleen, FAAP, Dennis Sees  [phone]    BREAST REDUCTION POST-OPERATIVE INSTRUCTIONS      SURGICAL BRA:  You will be in a surgical bra following the procedure. The surgical bra should be worn at all times except when showering for the first two weeks. There may be a small amount of oozing from the incisions for the first few days. This is normal.  You should replace any bandages as needed until all of the incisions remain dry. The bra may be washed when it gets soiled. You may wear another soft, front-fastening bra with no underwire if you wish. DRAINS:  If surgical drains are placed during the operation, they are usually removed in the office within a few days. You will receive a separate set of instructions for care of the drains. SWELLING: Mild to moderate swelling, as well as bruising, is normal after surgery and will usually resolve over a couple weeks. Excessive swelling, to the point where one side is much bigger and more obvious than the other side, is not not normal, and may be a sign of bleeding underneath the skin. If excessive swelling occurs, it is usually within the first 24 hours after surgery. You should call your surgeon or the on-call doctor immediately if you experience excessive swelling. ACTIVITY:  Take it easy for the first several days. No cleaning, housework, or strenuous activity. Do not lift anything over 10 pounds, including children. You may resume non-vigorous activities at two weeks, then normal activities at four weeks. POSITIONING:  Do not lie on your belly for two weeks. It is alright to lie on your side while sleeping. BATHING:  You may shower two days after surgery. No tub baths or swimming for two weeks. Remove any gauze before showering. MEDICATION:  You will receive prescriptions for an antibiotic and pain medication.   Take the antibiotic until it is finished, and the pain medication as needed according to the directions. Avoid aspirin for two weeks after surgery. You may take non-steroidal anti-inflammatories (e.g. ibuprofen) starting the day after surgery. Ibuprofen can be taken instead of, or in addition to, your prescription pain medication, according to the directions on the bottle. NUMBNESS:  Numbness or unusual sensations of the breasts are to be expected. It can take several weeks to months for this to resolve. Occasionally there may be persistent numbness. If your breasts become increasingly painful or tender, please call the office. FOLLOW-UP APPOINTMENT:      [***] Please call the office at [phone] during regular business hours to schedule an appointment on 1 week     [***] Your appointment is scheduled for ***, at ***    APPOINTMENT LOCATION:     [***] [facility]    [address] [place] [address], [address] [place] [address]     [***] [address] [place] [address] [place]    [place] [address] [place] [address] [facility]      SALLY Drain Instructions    PURPOSE:  You have had surgery during which a Catalino-Mcdaniel drain, or SALLY drain, has been placed. A SALLY drain is a rubber tube which goes under the skin and drains excess fluid during the healing process so that this fluid does not accumulate. There is a one-way valve which allows the fluid to collect in the bulb on the end of the drain. The drain is usually held in place by a single stitch. The color of the drainage can range from reddish to pink to straw-colored. CARE OF THE DRAIN:  Caring for the SALLY drain is easy. First, protect the drain so that it does not get pulled inadvertently. You may fasten them to your clothing with a safety pin through the floppy tag on the bulb. DO NOT place a pin through either the drain tubing or the bulb itself.   The drain works by maintaining a constant low pressure of suction when the bulb is in the collapsed (squeezed in) position. If the bulb will not maintain this collapsed position when it is recapped, please call the office, as the drain may not be working properly. MEASURING THE DRAINAGE:  Grasp the bulb in one hand and remove the cap with the other hand. This will cause the bulb to relax into a round shape. Holding the open end over a specimen cup, squirt the fluid into the cup by squeezing the bulb. Once the bulb is empty, squeeze it in (collapse it) with one hand, and replace the cap with your other hand. Then holding the measuring cup level, note how much fluid there is (in milliliters, mL), and record this number on the chart below. Discard the fluid in the toilet and rinse out the specimen cup. Note: your specimen cup may be in cubic centimeters (cc). 1 cc = 1 mL. REMOVAL:  The SALLY drain will be removed in the office when the daily drainage is low enough. You may be asked to call the office with your measuring totals to determine if the drain(s) is (are) ready to be removed. Removal involves minimal discomfort without the need for anesthesia. The drain site in the skin heals on its own once the drain is removed without any further stitches. BRING THIS DRAINAGE LOG WITH YOU TO THE OFFICE. SHOWERING:  Dr. Caleb Sicard may give you permission to shower while you have one or more SALLY drains in. Just let the water run over the drain sites and then pat them dry when you are done. Some patients like to place a long string necklace around their necks during showers to which they can safety pin the tag on the bulb to prevent it from dangling. DO NOT take a tub bath, go swimming (pool/river/lake/ocean), or otherwise submerge your body in water before all SALLY drains have been removed and you are released to swim.     THINGS TO WATCH FOR:  Please call the office immediately ([phone]) if you notice:   Sudden bright or dark red bleeding into the bulb or around the drain site in the skin   Dislodgment of the SALLY drain or if the drain falls out   Spreading redness around the drain site in the skin   Cloudy or foul-smelling drainage in the bulb or around the drain site   Fever, shaking chills, excessive swelling, pain or discomfort   Any other concerns or questions      Date           #1 (AM)             #1 (PM)             Daily Total #1  (AM+PM)             #2 (AM)           #2 (PM)           Daily  Total  #2  (AM+PM)                 DISCHARGE SUMMARY from Nurse    PATIENT INSTRUCTIONS:    After general anesthesia or intravenous sedation, for 24 hours or while taking prescription Narcotics:  · Limit your activities  · Do not drive and operate hazardous machinery  · Do not make important personal or business decisions  · Do  not drink alcoholic beverages  · If you have not urinated within 8 hours after discharge, please contact your surgeon on call. Report the following to your surgeon:  · Excessive pain, swelling, redness or odor of or around the surgical area  · Temperature over 100.5  · Nausea and vomiting lasting longer than 4 hours or if unable to take medications  · Any signs of decreased circulation or nerve impairment to extremity: change in color, persistent  numbness, tingling, coldness or increase pain  · Any questions    What to do at Home:  Recommended activity: See surgical instructions,         *  Please give a list of your current medications to your Primary Care Provider. *  Please update this list whenever your medications are discontinued, doses are      changed, or new medications (including over-the-counter products) are added. *  Please carry medication information at all times in case of emergency situations. These are general instructions for a healthy lifestyle:    No smoking/ No tobacco products/ Avoid exposure to second hand smoke  Surgeon General's Warning:  Quitting smoking now greatly reduces serious risk to your health.     Obesity, smoking, and sedentary lifestyle greatly increases your risk for illness    A healthy diet, regular physical exercise & weight monitoring are important for maintaining a healthy lifestyle    You may be retaining fluid if you have a history of heart failure or if you experience any of the following symptoms:  Weight gain of 3 pounds or more overnight or 5 pounds in a week, increased swelling in our hands or feet or shortness of breath while lying flat in bed. Please call your doctor as soon as you notice any of these symptoms; do not wait until your next office visit. The discharge information has been reviewed with the patient and caregiver. The patient and caregiver verbalized understanding. Discharge medications reviewed with the patient and caregiver and appropriate educational materials and side effects teaching were provided. ___________________________________________________________________________________________________________________________________    Patient Education   Scopolamine (Absorbed through the skin)   Scopolamine (ilif-LIR-o-meen)  Treat nausea and vomiting. Brand Name(s): Transderm Scop   There may be other brand names for this medicine. When This Medicine Should Not Be Used: You should not use this medicine if you have had an allergic reaction to scopolamine, or if you have narrow angle glaucoma. How to Use This Medicine:   Patch  · Your doctor will tell you how many patches to use, where to apply them, and how often to apply them. Do not use more patches or apply them more often than your doctor tells you to. · Read and follow the patient instructions that come with this medicine. Talk to your doctor or pharmacist if you have any questions. · To prevent motion sickness, apply the patch at least 4 hours before you need it. · Wash and dry your hands thoroughly before applying the patch. · Leave the patch in its sealed wrapper until you are ready to put it on. Tear the wrapper open carefully.  NEVER CUT the wrapper or the patch with scissors. Do not use any patch that has been cut by accident. · Take the liner off the sticky side before applying. · Apply the patch to dry, hairless skin behind the ear. · If the patch is loose or falls off, apply a new patch at a different place behind the ear. · After you take off the patch, wash the place where the patch was and your hands thoroughly. · Only one patch should be used at any time. If a dose is missed:   · If you forget to wear or change a patch, put one on as soon as you can. If it is almost time to put on your next patch, wait until then to apply a new patch and skip the one you missed. Do not apply extra patches to make up for a missed dose. How to Store and Dispose of This Medicine:   · Store the patches at room temperature in a closed container, away from heat, moisture, and direct light. · Fold the used patch in half with the sticky sides together. Throw any used patch away so that children or pets cannot get to it. You will also need to throw away old patches after the expiration date has passed. · Keep all medicine out of the reach of children. Never share your medicine with anyone. Drugs and Foods to Avoid:   Ask your doctor or pharmacist before using any other medicine, including over-the-counter medicines, vitamins, and herbal products. · Tell your doctor if you use anything else that makes you sleepy. Some examples are allergy medicine, narcotic pain medicine, and alcohol. · Do not drink alcohol while you are using this medicine. Warnings While Using This Medicine:   · Make sure your doctor knows if you are pregnant or breastfeeding, or if you have glaucoma, prostate problems, trouble urinating, blocked bowels, liver disease, kidney disease, or a history of seizures or mental illness. · This medicine can cause blurring of vision and other vision problems if it comes in contact with the eyes. This medicine may also cause problems with urination. If any of these reactions occur, remove the patch and call your doctor right away. · This medicine may make you dizzy or drowsy. Avoid driving, using machines, or doing anything else that could be dangerous if you are not alert. If you plan to participate in underwater sports, this medicine may cause disorienting effects. If this is a concern for you, talk with your doctor. · This medicine may make you sweat less and cause your body to get too hot. Be careful in hot weather, when you are exercising, or if using a sauna or whirlpool. · Tell any doctor or dentist who treats you that you are using this medicine. This medicine may affect certain medical test results. · Skin burns have been reported at the patch site in several patients wearing an aluminized transdermal system during a magnetic resonance imaging scan (MRI). Because Transderm Sc?p® contains aluminum, it is recommended to remove the system before undergoing an MRI. Possible Side Effects While Using This Medicine:   Call your doctor right away if you notice any of these side effects:  · Allergic reaction: Itching or hives, swelling in your face or hands, swelling or tingling in your mouth or throat, chest tightness, trouble breathing  · Blurred vision. · Confusion or memory loss. · Fast, slow, or uneven heartbeat. · Lightheadedness, dizziness, drowsiness, or fainting. · Seeing, hearing, or feeling things that are not there. · Severe eye pain. · Trouble urinating. If you notice these less serious side effects, talk with your doctor:   · Dry mouth. · Dry, itchy, or red eyes. · Restlessness. · Skin rash or redness. If you notice other side effects that you think are caused by this medicine, tell your doctor. Call your doctor for medical advice about side effects.  You may report side effects to FDA at 6-797-GBY-3985  © 2017 [facility] Information is for End User's use only and may not be sold, redistributed or otherwise used for commercial purposes. The above information is an  only. It is not intended as medical advice for individual conditions or treatments. Talk to your doctor, nurse or pharmacist before following any medical regimen to see if it is safe and effective for you.

## 2021-03-30 NOTE — H&P
History and Physical    HISTORY OF PRESENT ILLNESS  Montez Meza is a 60 y.o. female. HPI NEW Patient presents for consultation at the request of Dr. Luis M Ordonez for newly diagnosed LEFT breast cancer. This was detected from mammogram which led to diagnostic imaging and subsequent biopsies. She had 2 sites biopsied in left breast. One site showed cancer. History of left breast biopsies done in the past. All benign up until now. Some soreness in left breast.     Breast cancer-   - LEFT breast asymmetry 9 OC biopsy revealed invasive mammary carcinoma, ductal subtype. Also LEFT breast mass 9 OC 8 cmfn biopsy was benign adenosis.      Family history-  Maternal grandmother had breast cancer in her 42's. Maternal aunt had breast cancer in her 42's. Sister - questionable breast cancer? Age 48  Niece breast breast cancer age 40  Paternal cousin had breast cancer age 61. Niece had ovarian cancer age 28.   Mother had cervical cancer age 22.     Breast imaging-  2020 - left breast dx reyes and US done at [facility]: BI-RADS 4 c   2020 - screening mammogram at [facility]: BI-RADS 0       Past Medical History:   Diagnosis Date    Back pain      Chronic pain       BACK/JOINT PAIN    Constipation      Dyspepsia and other specified disorders of function of stomach      Gallstones 2014    GERD (gastroesophageal reflux disease)       INTERMITTANT    Joint pain      Nausea & vomiting              Past Surgical History:   Procedure Laterality Date    HX APPENDECTOMY       HX CHOLECYSTECTOMY   5-15-14     [facility] [id number]-[id number]-QlDr. Paulie Olivo    HX GYN   ,          HX HEENT        tonsillectomy    HX ORTHOPAEDIC        meniscus tear right knee    HX OTHER SURGICAL        removal of cyst from head    DC BREAST SURGERY PROCEDURE UNLISTED         BREAST BIOPSIES      Social History            Socioeconomic History    Marital status:        Spouse name: Not on file    Number of children: Not on file    Years of education: Not on file    Highest education level: Not on file   Occupational History    Not on file   Social Needs    Financial resource strain: Not on file    Food insecurity       Worry: Not on file       Inability: Not on file    Transportation needs       Medical: Not on file       Non-medical: Not on file   Tobacco Use    Smoking status: Never Smoker    Smokeless tobacco: Never Used   Substance and Sexual Activity    Alcohol use: Yes       Comment: wine/beer 2 per month    Drug use: Not on file    Sexual activity: Not on file   Lifestyle    Physical activity       Days per week: Not on file       Minutes per session: Not on file    Stress: Not on file   Relationships    Social connections       Talks on phone: Not on file       Gets together: Not on file       Attends Yarsani service: Not on file       Active member of club or organization: Not on file       Attends meetings of clubs or organizations: Not on file       Relationship status: Not on file    Intimate partner violence       Fear of current or ex partner: Not on file       Emotionally abused: Not on file       Physically abused: Not on file       Forced sexual activity: Not on file   Other Topics Concern    Not on file   Social History Narrative    Not on file      OB History    No obstetric history on file.       Obstetric Comments   Menarche 11, LMP 2017, # of children 2, age of 4st delivery 19, Hysterectomy/oophorectomy NO/NO, Breast bx YES, history of breast feeding YES, BCP YES, Hormone therapy NO                Current Outpatient Medications:     estradiol-norethindrone (COMBIPATCH) 0.05-0.14 mg/24 hr, 1 Patch by TransDERmal route two (2) times a week., Disp: , Rfl:     meloxicam (MOBIC) 15 mg tablet, Take 15 mg by mouth daily. , Disp: , Rfl:     solifenacin (VESICARE) 5 mg tablet, Take 5 mg by mouth daily. , Disp: , Rfl:     ETONOGESTREL/ETHINYL ESTRADIOL (NUVARING VA), Insert  into vagina. , Disp: , Rfl:   No Known Allergies  Review of Systems   Constitutional: Negative. HENT: Negative. Eyes: Negative. Respiratory: Negative. Cardiovascular: Negative. Gastrointestinal: Negative. Genitourinary: Negative. Musculoskeletal: Negative. Skin: Negative. Neurological: Negative. Endo/Heme/Allergies: Negative. Psychiatric/Behavioral: Negative. All other systems reviewed and are negative.        Physical Exam  Vitals signs and nursing note reviewed. Constitutional:       Appearance: She is well-developed. HENT:      Head: Normocephalic. Neck:      Musculoskeletal: Neck supple. Thyroid: No thyromegaly. Cardiovascular:      Rate and Rhythm: Normal rate and regular rhythm. Heart sounds: Normal heart sounds. Pulmonary:      Effort: Pulmonary effort is normal.      Breath sounds: Normal breath sounds. Chest:      Breasts: Breasts are symmetrical.         Right: No inverted nipple, mass, nipple discharge, skin change or tenderness. Left: No inverted nipple, mass, nipple discharge, skin change or tenderness. Abdominal:      Palpations: Abdomen is soft. Musculoskeletal: Normal range of motion. Lymphadenopathy:      Cervical: No cervical adenopathy. Skin:     General: Skin is warm and dry. Neurological:      Mental Status: She is alert and oriented to person, place, and time. BREAST ULTRASOUND, Preop planning  Indication:preop planning  left Breast 9:00   Technique: The area was scanned using a high-frequency linear-array near-field transducer  Findings: clip not seen  Impression: Biopsy site not visible with ultrasound  Disposition:  Will schedule lumpectomy with sentinel lymph node biopsy after mri     ASSESSMENT and PLAN      ICD-10-CM ICD-9-CM     1.  Malignant neoplasm of upper-inner quadrant of left breast in female, estrogen receptor positive (HCC)  C50.212 174.2 MRI BREAST BI W WO CONT     Z17.0 V86.0        61 yo female with left breast T1 N0 IDC receptors pending. She is here with her . I have reviewed the imaging and pathology with her and she was given copies of these reports.     90 minutes were spent face-to-face with the patient during this encounter and 90% of that time was spent on counseling and coordination of care. 1. Discussed lumpectomy and radiation vs mastectomy. Discussed reconstruction. MRI ordered to see if patient is a candidate for a lumpectomy. Discussed reduction lumpectomy. Discussed magseed localization. 2. Discussed sentinel lymph node biopsy. 3. Discussed external beam radiation. 4. Discussed hormone therapy. 5. Discussed the possibility of chemotherapy.      Left magseed guided lumpectomy, sln biopsy   Reductions by aboutanos

## 2021-03-30 NOTE — ROUTINE PROCESS
Patient: Emelyn Monique MRN: [id number]  SSN: [id number] YOB: 1965  Age: 60 y.o. Sex: female Patient is status post Procedure(s): LEFT BREAST LUMPECTOMY AND LEFT BREAST MAGSEED 
LEFT AXILLARY SENTINEL NODE BIOPSY 
LEFT BREAST RECONSTRUCION WITH BILATERAL BREAST REDUCTION. Isac Xavier Surgeon(s) and Role: 
Panel 1: 
   Jacob Kimbrough MD - Primary Panel 2: 
   * Cata Norton MD - Primary Local/Dose/Irrigation:  Carla Sherman Peripheral IV 03/30/21 Right Hand (Active) Site Assessment Clean, dry, & intact 03/30/21 0914 Phlebitis Assessment 0 03/30/21 0914 Infiltration Assessment 0 03/30/21 0914 Dressing Status Clean, dry, & intact 03/30/21 0914 Dressing Type Tape;Transparent 03/30/21 0914 Hub Color/Line Status Pink; Infusing 03/30/21 0914 Catalino-Mcdaniel Drain 03/30/21 Right Breast (Active) Site Assessment Clean, dry, & intact 03/30/21 1318 Dressing Status Clean, dry, & intact 03/30/21 1318 Status Patent;Draining 03/30/21 1318 Drainage Color Sanguinous 03/30/21 1318 Catalino-Mcdaniel Drain 03/30/21 Left Breast (Active) Site Assessment Clean, dry, & intact 03/30/21 1318 Dressing Status Clean, dry, & intact 03/30/21 1318 Status Patent;Draining 03/30/21 1318 Drainage Color Sanguinous 03/30/21 1318 Airway - Endotracheal Tube 03/30/21 Oral (Active) Dressing/Packing:  Incision 03/30/21 Breast-Dressing/Treatment: Xeroform;Gauze dressing/dressing sponge;ABD pad;Surgical bra(BACITRACIN OINTMENT) (03/30/21 1300) Incision 03/30/21 Axilla Left-Dressing/Treatment: Xeroform;Gauze dressing/dressing sponge;ABD pad(BACITRACIN OINTMENT) (03/30/21 1300) Splint/Cast:  ] Other:

## 2021-03-30 NOTE — BRIEF OP NOTE
Brief Postoperative Note    Patient: Parrish Larson  YOB: 1965  MRN: [id number]    Date of Procedure: 3/30/2021     Pre-Op Diagnosis: LEFT BREAST CANCER, s/p Left lumpectomy, Disproportion of reconstructed breast    Post-Op Diagnosis: Same      Procedure(s):  LEFT BREAST LUMPECTOMY AND LEFT BREAST MAGSEED  LEFT AXILLARY SENTINEL NODE BIOPSY  LEFT BREAST RECONSTRUCION WITH BILATERAL BREAST REDUCTION. Surgeon(s):   MD Federica Silva MD    Surgical Assistant: Registered Nurse First Assistant: Connie Reddy RN    Anesthesia: General     Estimated Blood Loss (mL): 25 cc    Complications: None    Specimens:   ID Type Source Tests Collected by Time Destination   1 : LEFT AXILLARY SENTINEL NODE #1 Fresh Lymph Node  Conor Gaines MD 3/30/2021 1110 Pathology   2 : LEFT AXILLARY SENTINEL NODE #2 Fresh Lymph Node  Conor Gaines MD 3/30/2021 1111 Pathology   3 : LEFT AXILLARY SENTINEL NODE #3 Fresh Lymph Node  Conor Gaines MD 3/30/2021 1117 Pathology   4 : LEFT BREAST LUMPECTOMY Fresh Breast  Conor Gaines MD 3/30/2021 1129 Pathology   5 : LEFT SUPERIOR MARGIN Fresh Breast  Conor Gaines MD 3/30/2021 1145 Pathology   6 : LEFT MEDIAL MARGIN Fresh Breast  Conor Gaines MD 3/30/2021 1146 Pathology   7 : RIGHT BREAST TISSUE Fresh Breast  Conor Gaines MD 3/30/2021 1207 Pathology   8 : LEFT BREAST TISSUE Fresh Breast  Conor Gaines MD 3/30/2021 1219 Pathology        Implants: * No implants in log *    Drains:   Catalino-Mcdaniel Drain 03/30/21 Right Breast (Active)   Site Assessment Clean, dry, & intact 03/30/21 1318   Dressing Status Clean, dry, & intact 03/30/21 1318   Status Patent;Draining 03/30/21 1318   Drainage Color Sanguinous 03/30/21 1318       Catalino-Mcdaniel Drain 03/30/21 Left Breast (Active)   Site Assessment Clean, dry, & intact 03/30/21 1318   Dressing Status Clean, dry, & intact 03/30/21 1318   Status Patent;Draining 03/30/21 1318   Drainage Color Sanguinous 03/30/21 1318       Findings: None    Electronically Signed by Jeimy White MD on 3/30/2021 at 1:45 PM

## 2021-03-30 NOTE — OP NOTES
[facility]  OPERATIVE REPORT    Name:  Salomon Bence  MR#:  [id number]  :  1965  ACCOUNT #:  [de-identified]  DATE OF SERVICE:  2021      PREOPERATIVE DIAGNOSES:  1. Left breast cancer. 2.  Status post left lumpectomy. 3.  Disproportion of reconstructed breast.    POSTOPERATIVE DIAGNOSES:  1. Left breast cancer. 2.  Status post left lumpectomy. 3.  Disproportion of reconstructed breast.    PROCEDURE PERFORMED:  1. Left breast reconstruction with reduction technique. 2.  Right breast reduction for symmetry. SURGEON:  Darline White MD    ASSISTANT:  Marcy Sandoval. ANESTHESIA:  General.    COMPLICATIONS:  None. SPECIMENS REMOVED:  Bilateral breast skin and tissue    IMPLANTS:  None    ESTIMATED BLOOD LOSS:  25 mL. DRAINS:  A #15 round Catalino-Mcdaniel drain in each breast for a total of two drains. INDICATIONS FOR SURGERY:  The patient is a 76-year-old woman who has a history of left-sided breast cancer. She is having a left lumpectomy with Dr. Kirill Morgan with the left sentinel node biopsy. She has desired breast reconstruction using reduction technique. She agreed to the risks and benefits and signed informed consent. She was then marked in the preoperative holding area in the upright position. She was then taken to the operating room, placed on the operating room table in supine position use. She was placed under general endotracheal anesthesia without complication. A time-out was performed in order to verify the patient's identity and surgical sites which were both correct. She was injected with local anesthesia which consisted of a mixture of 1% lidocaine with epinephrine and 0.5% Marcaine with epinephrine and injectable saline. She was prepped and draped in a sterile surgical fashion using Betadine paint. At this point, Dr. Rut Chan performed her portion of the procedure which included the left lumpectomy and sentinel node biopsy.   Please see her dictation from this date for further details. At this time, the nipple-areolar complex was marked at 45 mm using a cookie cutter. Superior and superior medial pedicle was designed around the nipple-areolar complex and de-epithelialized using a #10 blade. The inferior lateral portions of the vertical technique were excised and a vertical pillar was measured at approximately 6.5 cm. Everything inferior to this was removed in the form of large dog ear using #10 blade and electrocautery. The operative site was irrigated copiously using bacitracin and saline solution. The #15 round Catalino-Mcdaniel drain was placed at the base of the operated breast exiting the lateral chest wall. The pedicle had been dissected down to the pectoralis major muscle and was rotated upward and laterally for closure. The patient was temporarily closed using surgical stapler and placed in the upright and sitting position to evaluate for size and symmetry which appeared to be very symmetric in terms of appearance and aesthetically pleasing. The patient was placed into the supine position. The temporary staples were removed. The vertical pillar was closed using an interrupted 3-0 PDS suture. The deep dermis was closed using a buried interrupted 3-0 Monocryl. The skin was closed using running subcuticular 4-0 Monocryl. The T site was closed using an interrupted 3-0 PDS suture. The drain was secured using an interrupted 3-0 PDS suture. The right breast tissue weighed 339 g and the left breast tissue weighed 416 g. This included the lumpectomy specimen. All of the additional breast skin and tissue was sent to pathology for permanent section. The patient was dressed using bacitracin ointment, Xeroform, 4x4s, ABD, and a surgical bra. She was extubated and transferred to the PACU in stable condition. There were no complications during the procedure. The patient tolerated the procedure without complication.         Ardelle Pain Ana Lilia Downs MD [id number]  D:  03/30/2021 13:58  T:  03/30/2021 18:14  JOB #:  [id number]

## 2021-03-30 NOTE — BRIEF OP NOTE
Brief Postoperative Note    Patient: June Pam  YOB: 1965  MRN: [id number]    Date of Procedure: 3/30/2021     Pre-Op Diagnosis: LEFT BREAST CANCER    Post-Op Diagnosis: Same as preoperative diagnosis. Procedure(s):  LEFT BREAST LUMPECTOMY AND LEFT BREAST MAGSEED  LEFT AXILLARY SENTINEL NODE BIOPSY  LEFT BREAST RECONSTRUCION WITH BILATERAL BREAST REDUCTION. Surgeon(s):   MD Carol Ann Wynne MD    Surgical Assistant: Registered Nurse First Assistant: Kamran Mc RN    Anesthesia: General     Estimated Blood Loss (mL): Minimal    Complications: None    Specimens:   ID Type Source Tests Collected by Time Destination   1 : LEFT AXILLARY SENTINEL NODE #1 Fresh Lymph Node  Jose Ward MD 3/30/2021 1110 Pathology   2 : LEFT AXILLARY SENTINEL NODE #2 Fresh Lymph Node  Jose Ward MD 3/30/2021 1111 Pathology   3 : LEFT AXILLARY SENTINEL NODE #3 Fresh Lymph Node  Jose Ward MD 3/30/2021 1117 Pathology   4 : LEFT BREAST LUMPECTOMY Fresh Breast  Jose Ward MD 3/30/2021 1129 Pathology   5 : LEFT SUPERIOR MARGIN Fresh Breast  Jose Ward MD 3/30/2021 1145 Pathology   6 : LEFT MEDIAL MARGIN Fresh Breast  Jose Ward MD 3/30/2021 1146 Pathology   7 : RIGHT BREAST TISSUE Fresh Breast  Jose Ward MD 3/30/2021 1207 Pathology   8 : LEFT BREAST TISSUE Fresh Breast  Jose Ward MD 3/30/2021 1219 Pathology        Implants: * No implants in log *    Drains:   Catalino-Mcdaniel Drain 03/30/21 Right Breast (Active)       Findings: 3 sln removed     Electronically Signed by Joe Contreras MD on 3/30/2021 at 12:33 PM  Dictated stat

## 2021-04-07 ENCOUNTER — DOCUMENTATION ONLY (OUTPATIENT)
Dept: SURGERY | Age: 56
End: 2021-04-07

## 2021-04-07 ENCOUNTER — IMMUNIZATION (OUTPATIENT)
Dept: INTERNAL MEDICINE CLINIC | Age: 56
End: 2021-04-07
Payer: COMMERCIAL

## 2021-04-07 DIAGNOSIS — Z23 ENCOUNTER FOR IMMUNIZATION: Primary | ICD-10-CM

## 2021-04-07 PROCEDURE — 0002A COVID-19, MRNA, LNP-S, PF, 30MCG/0.3ML DOSE(PFIZER): CPT | Performed by: FAMILY MEDICINE

## 2021-04-07 PROCEDURE — 91300 COVID-19, MRNA, LNP-S, PF, 30MCG/0.3ML DOSE(PFIZER): CPT | Performed by: FAMILY MEDICINE

## 2021-04-12 ENCOUNTER — OFFICE VISIT (OUTPATIENT)
Dept: SURGERY | Age: 56
End: 2021-04-12
Payer: COMMERCIAL

## 2021-04-12 VITALS — DIASTOLIC BLOOD PRESSURE: 75 MMHG | HEART RATE: 88 BPM | SYSTOLIC BLOOD PRESSURE: 132 MMHG | TEMPERATURE: 98.7 F

## 2021-04-12 DIAGNOSIS — Z17.0 MALIGNANT NEOPLASM OF UPPER-INNER QUADRANT OF LEFT BREAST IN FEMALE, ESTROGEN RECEPTOR POSITIVE (HCC): Primary | ICD-10-CM

## 2021-04-12 DIAGNOSIS — C50.212 MALIGNANT NEOPLASM OF UPPER-INNER QUADRANT OF LEFT BREAST IN FEMALE, ESTROGEN RECEPTOR POSITIVE (HCC): Primary | ICD-10-CM

## 2021-04-12 PROCEDURE — 99024 POSTOP FOLLOW-UP VISIT: CPT | Performed by: SURGERY

## 2021-04-12 NOTE — PROGRESS NOTES
HISTORY OF PRESENT ILLNESS  Martin Hanson is a 60 y.o. female. HPI  ESTABLISHED patient here for post op follow up, s/p LEFT breast lumpectomy on 3/30/21. She is doing well. Having some swelling and pain in the evenings. Incision is dry and intact. 3/30/31 - LEFT breast lumpectomy, b/l recon/reduction  t1 n0 [facility]     Breast cancer-  1/72021 - LEFT breast asymmetry 9 OC biopsy revealed invasive mammary carcinoma, ductal subtype. Also LEFT breast mass 9 OC 8 cmfn biopsy was benign adenosis.      Family history-  Maternal grandmother had breast cancer in her 42's. Maternal aunt had breast cancer in her 42's. Sister - questionable breast cancer? Age 48  Niece breast breast cancer age 40  Paternal cousin had breast cancer age 61. Niece had ovarian cancer age 28. Mother had cervical cancer age 22. Review of Systems   All other systems reviewed and are negative. Physical Exam  Vitals signs and nursing note reviewed.    Chest:          Comments: Mastectomy sites healing         ASSESSMENT and PLAN    ICD-10-CM ICD-9-CM    1. Malignant neoplasm of upper-inner quadrant of left breast in female, estrogen receptor positive ([facility] [address]  C50.212 174.2     Z17.0 V86.0      - healing well  mammaprint   Refer to med onc

## 2021-04-12 NOTE — PATIENT INSTRUCTIONS
Breast Cancer: Care Instructions Your Care Instructions Breast cancer occurs when abnormal cells grow out of control in the breast. These cancer cells can spread within the breast, to nearby lymph nodes and other tissues, and to other parts of the body. Being treated for cancer can weaken your body, and you may feel very tired. Get the rest your body needs so you can feel better. Finding out that you have cancer is scary. You may feel many emotions and may need some help coping. Seek out family, friends, and counselors for support. You also can do things at home to make yourself feel better while you go through treatment. Call the [employer] (0-525.166.9927) or visit its website at [employer] for more information. Follow-up care is a key part of your treatment and safety. Be sure to make and go to all appointments, and call your doctor if you are having problems. It's also a good idea to know your test results and keep a list of the medicines you take. How can you care for yourself at home? · Take your medicines exactly as prescribed. Call your doctor if you think you are having a problem with your medicine. You may get medicine for nausea and vomiting if you have these side effects. · Follow your doctor's instructions to relieve pain. Pain from cancer and surgery can almost always be controlled. Use pain medicine when you first notice pain, before it becomes severe. · Eat healthy food. If you do not feel like eating, try to eat food that has protein and extra calories to keep up your strength and prevent weight loss. Drink liquid meal replacements for extra calories and protein. Try to eat your main meal early. · Get some physical activity every day, but do not get too tired. Keep doing the hobbies you enjoy as your energy allows. · Do not smoke. Smoking can make your cancer worse. If you need help quitting, talk to your doctor about stop-smoking programs and medicines.  These can increase your chances of quitting for good. · Take steps to control your stress and workload. Learn relaxation techniques. ? Share your feelings. Stress and tension affect our emotions. By expressing your feelings to others, you may be able to understand and cope with them. ? Consider joining a support group. Talking about a problem with your spouse, a good friend, or other people with similar problems is a good way to reduce tension and stress. ? Express yourself through art. Try writing, crafts, dance, or art to relieve stress. Some dance, writing, or art groups may be available just for people who have cancer. ? Be kind to your body and mind. Getting enough sleep, eating a healthy diet, and taking time to do things you enjoy can contribute to an overall feeling of balance in your life and can help reduce stress. ? Get help if you need it. Discuss your concerns with your doctor or counselor. · If you are vomiting or have diarrhea: ? Drink plenty of fluids to prevent dehydration. Choose water and other caffeine-free clear liquids. If you have kidney, heart, or liver disease and have to limit fluids, talk with your doctor before you increase the amount of fluids you drink. ? When you are able to eat, try clear soups, mild foods, and liquids until all symptoms are gone for 12 to 48 hours. Other good choices include dry toast, crackers, cooked cereal, and gelatin dessert, such as Jell-O. · If you have not already done so, prepare a list of advance directives. Advance directives are instructions to your doctor and family members about what kind of care you want if you become unable to speak or express yourself. When should you call for help? Call 911 anytime you think you may need emergency care. For example, call if: 
  · You passed out (lost consciousness).   
Call your doctor now or seek immediate medical care if: 
  · You have a fever.  
  · You have abnormal bleeding.  
  · You think you have an infection.  
  · You have new or worse pain.  
  · You have new symptoms, such as a cough, belly pain, vomiting, diarrhea, or a rash. Watch closely for changes in your health, and be sure to contact your doctor if: 
  · You are much more tired than usual.  
  · You have swollen glands in your armpits, groin, or neck.  
  · You do not get better as expected. Where can you learn more? Go to http://www.[employer].com/ Enter V321 in the search box to learn more about \"Breast Cancer: Care Instructions. \" Current as of: December 17, 2020               Content Version: 12.8 © 0102-2668 [employer]. Care instructions adapted under license by [employer] (Closed) (which disclaims liability or warranty for this information). If you have questions about a medical condition or this instruction, always ask your healthcare professional. [address] any warranty or liability for your use of this information.

## 2021-04-12 NOTE — LETTER
4/14/2021 Patient: Mohan Colin YOB: 1965 Date of Visit: 4/12/2021 Annelise Lopez NP 
[address] [id number] Via Fax: [phone] Dear Annelise Lopez NP, Thank you for referring Ms. Josh Trinidad to [address] MA[address] OFF[address] [place] [address] for evaluation. My notes for this consultation are attached. If you have questions, please do not hesitate to call me. I look forward to following your patient along with you. Sincerely, Karthik Harvey MD

## 2021-04-21 DIAGNOSIS — C50.212 MALIGNANT NEOPLASM OF UPPER-INNER QUADRANT OF LEFT BREAST IN FEMALE, ESTROGEN RECEPTOR POSITIVE (HCC): Primary | ICD-10-CM

## 2021-04-21 DIAGNOSIS — Z17.0 MALIGNANT NEOPLASM OF UPPER-INNER QUADRANT OF LEFT BREAST IN FEMALE, ESTROGEN RECEPTOR POSITIVE (HCC): Primary | ICD-10-CM

## 2021-04-27 ENCOUNTER — DOCUMENTATION ONLY (OUTPATIENT)
Dept: ONCOLOGY | Age: 56
End: 2021-04-27

## 2021-04-27 ENCOUNTER — OFFICE VISIT (OUTPATIENT)
Dept: ONCOLOGY | Age: 56
End: 2021-04-27
Payer: COMMERCIAL

## 2021-04-27 VITALS
SYSTOLIC BLOOD PRESSURE: 133 MMHG | OXYGEN SATURATION: 95 % | BODY MASS INDEX: 37.1 KG/M2 | DIASTOLIC BLOOD PRESSURE: 90 MMHG | HEART RATE: 70 BPM | WEIGHT: 244 LBS | RESPIRATION RATE: 18 BRPM | TEMPERATURE: 98.5 F

## 2021-04-27 DIAGNOSIS — C50.912 MALIGNANT NEOPLASM OF LEFT BREAST IN FEMALE, ESTROGEN RECEPTOR POSITIVE, UNSPECIFIED SITE OF BREAST (HCC): Primary | ICD-10-CM

## 2021-04-27 DIAGNOSIS — Z17.0 MALIGNANT NEOPLASM OF LEFT BREAST IN FEMALE, ESTROGEN RECEPTOR POSITIVE, UNSPECIFIED SITE OF BREAST (HCC): Primary | ICD-10-CM

## 2021-04-27 PROCEDURE — 99203 OFFICE O/P NEW LOW 30 MIN: CPT | Performed by: INTERNAL MEDICINE

## 2021-04-27 RX ORDER — MELATONIN 5 MG
10 CAPSULE ORAL
COMMUNITY

## 2021-04-27 NOTE — PROGRESS NOTES
[facility] at [address], [id number] [place] [address] Road, [address] [place] [address]  W: [phone]  F: [phone]    Reason for Visit:   Kevin Arboleda is a 60 y.o. female who is seen in consultation at the request of Dr. Julianna Vieyra for evaluation of Left Invasive breast cancer- NOS    Treatment History:   · 3/30/3031: Left Lumpectomy and SLN, R breast reduction mammoplasty- 15 mm Invasive carcinoma- NOS, grade 1, margins negative, ER 99% MD 99% HER2 wendy negative, Ki67 13%. 3 negative nodes . R breast- ADH    History of Present Illness:   Patient is a 60 y.o. female seen for breast cancer. She had an abnormal mammogram in Jan 2021 which showed a Left breast abnormality at 9 O Clock. Biopsy showed Invasive mammary carcinoma. She then had an MRI that showed a mass 1.3 x 1.7 cm and no adenopathy. She had a Lumpectomy and SLN as above and comes to review management    She is otherwise healthy and has no pain, fevers, chills, sweats, HA, falls, cough, diarrhea or bleeding. She is post menopausal    Extensive FH of breast cancer reviewed from Dr. Rm Bee note and confirmed with the patient  Maternal grandmother had breast cancer in her 42's. Maternal aunt had breast cancer in her 42's. Sister - questionable breast cancer? Age 48  Niece breast breast cancer age 40  Paternal cousin had breast cancer age 61. Niece had ovarian cancer age 28.   Mother had cervical cancer age 22  Past Medical History:   Diagnosis Date    Back pain     Chronic pain     BACK/JOINT PAIN    Constipation     Dyspepsia and other specified disorders of function of stomach     Gallstones 4/28/2014    GERD (gastroesophageal reflux disease)     INTERMITTANT    Joint pain     Motion sickness     Nausea & vomiting       Past Surgical History:   Procedure Laterality Date    HX APPENDECTOMY  1985    HX BREAST LUMPECTOMY Left 3/30/2021    LEFT BREAST LUMPECTOMY AND LEFT BREAST MAGSEED performed by Fern Rios MD at [facility] AMBULATORY OR    HX BREAST RECONSTRUCTION Bilateral 3/30/2021    LEFT BREAST RECONSTRUCION WITH BILATERAL BREAST REDUCTION. performed by Chauncey Foreman MD at [address] HX CHOLECYSTECTOMY  5-15-14    lap [id number] Young    HX COLONOSCOPY  2013    HX GYN  ,         HX HEENT      tonsillectomy    HX HEENT      SKIN GRAFT UPPER FRONT TEETH     HX ORTHOPAEDIC  2003    meniscus tear right knee    HX OTHER SURGICAL  1992    removal of cyst from head    HX WISDOM TEETH EXTRACTION      LA BREAST SURGERY PROCEDURE UNLISTED Bilateral LAST YEARS    BREAST BIOPSIES      Social History     Tobacco Use    Smoking status: Never Smoker    Smokeless tobacco: Never Used   Substance Use Topics    Alcohol use: Yes     Comment: wine/beer 2 per month      Family History   Problem Relation Age of Onset   [facility] Arthritis-osteo Mother     Diabetes Mother     Hypertension Mother     Cancer Father         LUNG    Hypertension Father     High Cholesterol Father     Cancer Sister     Breast Cancer Other     Ovarian Cancer Other     Breast Cancer Maternal Grandmother     Cancer Maternal Aunt     High Cholesterol Brother     Other Daughter         MIXED CONNECTIVE TISSUE DISORDER     Anesth Problems Neg Hx      Current Outpatient Medications   Medication Sig    multivitamin (ONE A DAY) tablet Take 1 Tab by mouth daily.  OTHER 1 Tab by Other route daily. AMBEREN- SUPPLEMENT   Indications: post-menopausal symptoms    meloxicam (MOBIC) 15 mg tablet Take 15 mg by mouth daily.  solifenacin (VESICARE) 5 mg tablet Take 5 mg by mouth daily. No current facility-administered medications for this visit. No Known Allergies     Review of Systems: A complete review of systems was obtained, negative except as described above.     Physical Exam:     Visit Vitals  LMP 2014     ECOG PS: 0  General: No distress  Eyes: PERRLA, anicteric sclerae  HENT: Atraumatic  Neck: Supple  Respiratory:  normal respiratory effort  CV: Normal rate, no peripheral edema  GI: Soft, nondistended  MS: Normal gait and station. Digits without clubbing or cyanosis. Skin: No rashes, ecchymoses, or petechiae. Normal temperature, turgor, and texture. Psych: Alert, oriented, appropriate affect, normal judgment/insight    Results:     Lab Results   Component Value Date/Time    WBC 5.4 03/11/2021 02:38 PM    HGB 12.3 03/11/2021 02:38 PM    HCT 36.5 03/11/2021 02:38 PM    PLATELET 089 60/52/3504 02:38 PM    MCV 90.3 03/11/2021 02:38 PM    ABS. NEUTROPHILS 3.3 03/11/2021 02:38 PM     Lab Results   Component Value Date/Time    Sodium 139 05/12/2014 03:37 PM    Potassium 4.6 05/12/2014 03:37 PM    Chloride 107 05/12/2014 03:37 PM    CO2 29 05/12/2014 03:37 PM    Glucose 119 (H) 05/12/2014 03:37 PM    BUN 15 05/12/2014 03:37 PM    Creatinine 0.60 05/12/2014 03:37 PM    GFR est AA >60 05/12/2014 03:37 PM    GFR est non-AA >60 05/12/2014 03:37 PM    Calcium 8.9 05/12/2014 03:37 PM     No results found for: TBILI, ALT, AP, TP, ALB, GLOB      Records reviewed and summarized above. Pathology report(s) reviewed     FINAL PATHOLOGIC DIAGNOSIS   1. Left breast, lumpectomy:   Invasive ductal carcinoma with lobular features   Ductal carcinoma in situ associated with invasive tumor   Biopsy site   INVASIVE CARCINOMA OF THE BREAST: Resection   SPECIMEN   Procedure: Excision (less than total mastectomy)   Specimen Laterality: Left   TUMOR   Histologic Type:  Invasive carcinoma of no special type (ductal)   Glandular (Acinar) / Tubular Differentiation: Score 2   Nuclear Pleomorphism: Score 1   Mitotic Rate: Score 1   Overall Grade: Grade 1 (scores of 3, 4 or 5)   Tumor Size: 15 mm   Ductal Carcinoma In Situ (DCIS): Present   Architectural Patterns: Cribriform, Solid   Nuclear Grade: Grade I (low)   Necrosis: Not identified   Tumor Extent   Treatment Effect in the Breast: No known presurgical therapy   MARGINS   Invasive Carcinoma Margins: Uninvolved by invasive carcinoma   Distance from Closest Margin (Millimeters): 5 mm   Closest Margin(s): Lateral   DCIS Margins: Uninvolved by DCIS   Distance from Closest Margin (Millimeters): 7 mm   Closest Margin(s): Lateral   LYMPH NODES   Regional Lymph Nodes: Uninvolved by tumor cells   Total Number of Lymph Nodes Examined: 4   Number of Weatherby Nodes Examined: 4   HORMONE RECEPTOR, PROLIFERATION MARKER AND HER-2 ANALYSIS   Performed on outside biopsy [id number] [facility] (cc: [id number])   Estrogen receptor: Positive (99%)   Progesterone receptor: Positive (99%)   Ki-67: 13%   HER-2 IHC: Negative   PATHOLOGIC STAGE CLASSIFICATION (pTNM, AJCC 8th Edition)   Primary Tumor (pT): pT1c   Regional Lymph Nodes Modifier: (sn): Weatherby node(s) evaluated   Regional Lymph Nodes (pN): pN0   2. Left axillary sentinel lymph node #1, excision:   Two benign lymph nodes (0/2)   3. Left axillary sentinel node #2, excision:   One benign lymph node (0/1)   4. Left axillary sentinel node #3, excision:   One benign lymph node (0/1)   5. Left superior margin:   Negative for tumor   6. Left medial margin:   Negative for tumor   7. Right breast tissue, 548 g, mammoplasty:   Benign skin and mammary tissue with focal atypical ductal hyperplasia   8. Left breast tissue, 350 g, mammoplasty:   Benign mammary tissue with mild fibrocystic change       Radiology report(s) reviewed above. Assessment:   1) Left breast invasive breast cancer    S/P Lumpectomy and SLN 3/30/2021  [id number]-Stage IA  ER 99% RI 99% HER2 wendy negative  Grade 1   Negative margins  Eron mathur    Reviewed the excellent prognosis of stage I HR+ breast cancer after surgery, RT and endocrine therapy in general. There is a persistent low risk of recurrence - over time. Adjuvant endocrine therapy reduces recurrences by 30% and saves 1 in 3 lives.      We then discussed that she has a clinically low risk tumor, however rarely the genomic risk may suggest high risk behavior which may then lead to an indication for chemotherapy. Her mammaprint is pending    We talked about the short and long term side effects of adjuvant AIs  She will meet with Rad Onc and if her mammaprint shows low risk disease then will plan to see her post RT to review starting adjuvant AI    2) R BREAST ADH  She will start an AI    3) Strong FH of multiple malignancies  MYRISK reviewed and negative    4) Psychosocial   Coping well and comes with supportive   Met with SW    Plan:     · Refer to Dr. Nilson Rodriguez  · Call with Mammaprint results  · See me in 2 months when we will start endocrine therapy     I appreciate the opportunity to participate in MsIvone Brendon Kimble rosemary.     Signed By: Karina French MD

## 2021-04-27 NOTE — PROGRESS NOTES
Oncology Social Work  Psychosocial Assessment    Reason for Assessment:      [] Social Work Referral [x] Initial Assessment [] New Diagnosis [] Other    Advance Care Planning:  Advance Care Planning 3/11/2021   Confirm Advance Directive None   Patient Would Like to Complete Advance Directive Yes   Does the patient have other document types Organ Directive   Patient does not have an advanced medical directive, and did not express interest in completing one today. Provided a blank sample AMD, and offered to complete this document with the patient at any time. Sources of Information:    [x]Patient  [x]Family  []Staff  []Medical Record    Mental Status:    [x]Alert  []Lethargic  []Unresponsive   [] Unable to assess   Oriented to:  [x]Person  [x]Place  [x]Time  [x]Situation      Relationship Status:  []Single  [x]  []Significant Other/Life Partner  []  []  []    Living Circumstances:  []Lives Alone  [x]Family/Significant Other in Household  []Roommates  []Children in the Home  []Paid Caregivers  []Assisted Living Facility/Group Home  []Skilled [address]  []Homeless  []Incarcerated  []Environmental/Care Concerns  []Other:    Employment Status:  [x]Employed Full-time []Employed Part-time []Homemaker  [] Retired [] Short-Term Disability [] [facility]  [] Unemployed     Barriers to Learning:    []Language  []Developmental  []Cognitive  []Altered Mental Status  []Visual/Hearing Impairment  []Unable to Read/Write  []Motivational  [] Challenges Understanding Medical Jargon [x]No Barriers Identified      Financial/Legal Concerns:    []Uninsured  []Limited Income/Resources  []Non-Citizen  []Food Insecurity [x]No Concerns Identified   []Other:    Moravian/Spiritual/Existential:  Does patient have any spiritual or Confucianist beliefs? [x] Yes [] No  Is the patient involved in a spiritual, blade or Confucianist community?  [] Yes [] No  Patient expressing spiritual/existential angst? [] Yes [x] No  Notes: Patient identifies as Taoist.      Support System:    Identified Support Person/Group:  [x]Strong  []Fair  []Limited    Coping with Illness:   [x]  Coping Well  [] Challenges Coping with Serious Illness [] Situational Depression [] Situational Anxiety [] Anticipatory Grief  [] Recent Loss [] Caregiver [place]            Narrative:  Met with the patient and her  during her initial office visit today. Patient is being seen for breast cancer. Patient is status post LEFT breast lumpectomy on 3/30/21. Patient lives with her . They have an adult daughter in [place], as well as a grandson, and granddaughter on the way. They have a son in [facility]. 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 She utilized short term disability for her surgery, and is now working remotely. The patient had a PCP - James [address] NP, but that provider left the practice. She is now seeing Dr. Sander Ring with the same practice. Invited the patient to the upcoming Virtual Support Group meetings, led by this . Provided this 's contact information and offered continued support as needed. Plan:   1. Introduced self and role of this  in the [address] [place] [address] Dr. 2.  Informed the patient of the [facility] and available resources there. 3.  Continue to meet with the patient when she returns to the clinic for ongoing assessment of the patients adjustment to her diagnosis and treatment. 4.  Ongoing psychosocial support as desired by patient.       Referral:   Support Groups referral  Jamar Alvarez LCSW

## 2021-04-27 NOTE — PROGRESS NOTES
Ibis Nash is a 60 y.o. female    Chief Complaint   Patient presents with    New Patient     Left breast cancer       1. Have you been to the ER, urgent care clinic since your last visit? Hospitalized since your last visit? No    2. Have you seen or consulted any other health care providers outside of the [address] [place] [address] since your last visit? Include any pap smears or colon screening.  No     Patient states she has had both doses of the covid vaccine

## 2021-04-28 PROBLEM — C50.912 MALIGNANT NEOPLASM OF LEFT BREAST IN FEMALE, ESTROGEN RECEPTOR POSITIVE (HCC): Status: ACTIVE | Noted: 2021-04-28

## 2021-04-28 PROBLEM — Z17.0 MALIGNANT NEOPLASM OF LEFT BREAST IN FEMALE, ESTROGEN RECEPTOR POSITIVE (HCC): Status: ACTIVE | Noted: 2021-04-28

## 2021-05-03 ENCOUNTER — DOCUMENTATION ONLY (OUTPATIENT)
Dept: ONCOLOGY | Age: 56
End: 2021-05-03

## 2021-05-03 NOTE — PROGRESS NOTES
Please let her know that mammaprint is low risk    No chemotherapy needed.  She will see [facility] oncology and I will see her after RT

## 2021-05-04 ENCOUNTER — TELEPHONE (OUTPATIENT)
Dept: ONCOLOGY | Age: 56
End: 2021-05-04

## 2021-05-04 NOTE — TELEPHONE ENCOUNTER
[facility] verified x2  Notified patient that her mammaprint was low risk and no Chemotherapy is needed per MD High. She will see radiation oncology and us afterwards. Patient voiced understanding and has an appt scheduled at the end of the month with Rad. Oncology and us in June.

## 2021-05-21 ENCOUNTER — HOSPITAL ENCOUNTER (OUTPATIENT)
Dept: RADIATION THERAPY | Age: 56
Discharge: HOME | End: 2021-05-21

## 2021-05-24 ENCOUNTER — TELEPHONE (OUTPATIENT)
Dept: SURGERY | Age: 56
End: 2021-05-24

## 2021-05-24 ENCOUNTER — OFFICE VISIT (OUTPATIENT)
Dept: SURGERY | Age: 56
End: 2021-05-24
Payer: COMMERCIAL

## 2021-05-24 VITALS
HEIGHT: 68 IN | SYSTOLIC BLOOD PRESSURE: 119 MMHG | HEART RATE: 79 BPM | BODY MASS INDEX: 37.1 KG/M2 | DIASTOLIC BLOOD PRESSURE: 78 MMHG

## 2021-05-24 DIAGNOSIS — C50.312 MALIGNANT NEOPLASM OF LOWER-INNER QUADRANT OF LEFT BREAST IN FEMALE, ESTROGEN RECEPTOR POSITIVE (HCC): Primary | ICD-10-CM

## 2021-05-24 DIAGNOSIS — Z17.0 MALIGNANT NEOPLASM OF LOWER-INNER QUADRANT OF LEFT BREAST IN FEMALE, ESTROGEN RECEPTOR POSITIVE (HCC): Primary | ICD-10-CM

## 2021-05-24 PROCEDURE — 99024 POSTOP FOLLOW-UP VISIT: CPT | Performed by: SURGERY

## 2021-05-24 NOTE — Clinical Note
5/26/2021 Patient: Christina Lopez YOB: 1965 Date of Visit: 5/24/2021 Sunny Almanzar NP Via Dear Sunny Almanzar NP, Thank you for referring Ms. James Lawson to [address] [facility] [place] MAIN OFFICE [address] [place] [address] for evaluation. My notes for this consultation are attached. If you have questions, please do not hesitate to call me. I look forward to following your patient along with you. Sincerely, Luisa Jaramillo MD

## 2021-05-24 NOTE — PROGRESS NOTES
HISTORY OF PRESENT ILLNESS  Keyur Carmona is a 60 y.o. female. HPI  ESTABLISHED patient here for follow up LEFT breast cancer. She is doing well. Every once in awhile she will have a shooting pain. \"Everything has been good. \"     Will start AI after radiation. Saw Dr. Issac Salinas and will be starting XRT soon.      Breast cancer-  1/72021 - LEFT breast asymmetry 9 OC biopsy revealed invasive mammary carcinoma, ductal subtype. Also LEFT breast mass 9 OC 8 cmfn biopsy was benign adenosis. 3/30/31 - LEFT breast lumpectomy, b/l recon/reduction  t1 n0 sln negative   dr. Som Coyle risk MP - Dr. Jannet Salinas - XRT      Family history-  Maternal grandmother had breast cancer in her 42's. Maternal aunt had breast cancer in her 42's. Sister - questionable breast cancer? Age 48  Niece breast breast cancer age 40  Paternal cousin had breast cancer age 61. Niece had ovarian cancer age 28. Mother had cervical cancer age 22. Review of Systems   All other systems reviewed and are negative. Physical Exam  Vitals and nursing note reviewed.    Chest:          Comments: Incisions healing well  Good cosmesis        ASSESSMENT and PLAN    ICD-10-CM ICD-9-CM    1. Malignant neoplasm of lower-inner quadrant of left breast in female, estrogen receptor positive (HCC)  C50.312 174.3     Z17.0 V86.0      - healing well  - mammogram 6 months after xrt  F/u after this with NP

## 2021-05-24 NOTE — PATIENT INSTRUCTIONS
Breast Cancer: Care Instructions  Your Care Instructions     Breast cancer occurs when abnormal cells grow out of control in the breast. These cancer cells can spread within the breast, to nearby lymph nodes and other tissues, and to other parts of the body. Being treated for cancer can weaken your body, and you may feel very tired. Get the rest your body needs so you can feel better. Finding out that you have cancer is scary. You may feel many emotions and may need some help coping. Seek out family, friends, and counselors for support. You also can do things at home to make yourself feel better while you go through treatment. Call the [employer] (3-880.314.1734) or visit its website at [employer] for more information. Follow-up care is a key part of your treatment and safety. Be sure to make and go to all appointments, and call your doctor if you are having problems. It's also a good idea to know your test results and keep a list of the medicines you take. How can you care for yourself at home? · Take your medicines exactly as prescribed. Call your doctor if you think you are having a problem with your medicine. You may get medicine for nausea and vomiting if you have these side effects. · Follow your doctor's instructions to relieve pain. Pain from cancer and surgery can almost always be controlled. Use pain medicine when you first notice pain, before it becomes severe. · Eat healthy food. If you do not feel like eating, try to eat food that has protein and extra calories to keep up your strength and prevent weight loss. Drink liquid meal replacements for extra calories and protein. Try to eat your main meal early. · Get some physical activity every day, but do not get too tired. Keep doing the hobbies you enjoy as your energy allows. · Do not smoke. Smoking can make your cancer worse. If you need help quitting, talk to your doctor about stop-smoking programs and medicines.  These can increase your chances of quitting for good. · Take steps to control your stress and workload. Learn relaxation techniques. ? Share your feelings. Stress and tension affect our emotions. By expressing your feelings to others, you may be able to understand and cope with them. ? Consider joining a support group. Talking about a problem with your spouse, a good friend, or other people with similar problems is a good way to reduce tension and stress. ? Express yourself through art. Try writing, crafts, dance, or art to relieve stress. Some dance, writing, or art groups may be available just for people who have cancer. ? Be kind to your body and mind. Getting enough sleep, eating a healthy diet, and taking time to do things you enjoy can contribute to an overall feeling of balance in your life and can help reduce stress. ? Get help if you need it. Discuss your concerns with your doctor or counselor. · If you are vomiting or have diarrhea:  ? Drink plenty of fluids to prevent dehydration. Choose water and other caffeine-free clear liquids. If you have kidney, heart, or liver disease and have to limit fluids, talk with your doctor before you increase the amount of fluids you drink. ? When you are able to eat, try clear soups, mild foods, and liquids until all symptoms are gone for 12 to 48 hours. Other good choices include dry toast, crackers, cooked cereal, and gelatin dessert, such as Jell-O.  · If you have not already done so, prepare a list of advance directives. Advance directives are instructions to your doctor and family members about what kind of care you want if you become unable to speak or express yourself. When should you call for help? Call 911 anytime you think you may need emergency care. For example, call if:    · You passed out (lost consciousness).    Call your doctor now or seek immediate medical care if:    · You have a fever.     · You have abnormal bleeding.     · You think you have an infection.     · You have new or worse pain.     · You have new symptoms, such as a cough, belly pain, vomiting, diarrhea, or a rash. Watch closely for changes in your health, and be sure to contact your doctor if:    · You are much more tired than usual.     · You have swollen glands in your armpits, groin, or neck.     · You do not get better as expected. Where can you learn more? Go to http://www.[employer].com/  Enter V321 in the search box to learn more about \"Breast Cancer: Care Instructions. \"  Current as of: December 17, 2020               Content Version: 12.8  © 1336-5125 [employer]. Care instructions adapted under license by [employer] (which disclaims liability or warranty for this information). If you have questions about a medical condition or this instruction, always ask your healthcare professional. [address] any warranty or liability for your use of this information.

## 2021-06-07 ENCOUNTER — HOSPITAL ENCOUNTER (OUTPATIENT)
Dept: RADIATION THERAPY | Age: 56
Discharge: HOME | End: 2021-06-07
Payer: COMMERCIAL

## 2021-06-07 PROCEDURE — 77412 RADIATION TX DELIVERY LVL 3: CPT

## 2021-06-07 PROCEDURE — 77417 THER RADIOLOGY PORT IMAGE(S): CPT

## 2021-06-08 ENCOUNTER — HOSPITAL ENCOUNTER (OUTPATIENT)
Dept: RADIATION THERAPY | Age: 56
Discharge: HOME | End: 2021-06-08
Payer: COMMERCIAL

## 2021-06-08 PROCEDURE — 77412 RADIATION TX DELIVERY LVL 3: CPT

## 2021-06-08 PROCEDURE — 77417 THER RADIOLOGY PORT IMAGE(S): CPT

## 2021-06-09 ENCOUNTER — HOSPITAL ENCOUNTER (OUTPATIENT)
Dept: RADIATION THERAPY | Age: 56
Discharge: HOME | End: 2021-06-09
Payer: COMMERCIAL

## 2021-06-10 ENCOUNTER — HOSPITAL ENCOUNTER (OUTPATIENT)
Dept: RADIATION THERAPY | Age: 56
Discharge: HOME | End: 2021-06-10
Payer: COMMERCIAL

## 2021-06-10 PROCEDURE — 77412 RADIATION TX DELIVERY LVL 3: CPT

## 2021-06-10 PROCEDURE — 77417 THER RADIOLOGY PORT IMAGE(S): CPT

## 2021-06-11 ENCOUNTER — HOSPITAL ENCOUNTER (OUTPATIENT)
Dept: RADIATION THERAPY | Age: 56
Discharge: HOME | End: 2021-06-11
Payer: COMMERCIAL

## 2021-06-11 PROCEDURE — 77412 RADIATION TX DELIVERY LVL 3: CPT

## 2021-06-11 PROCEDURE — 77417 THER RADIOLOGY PORT IMAGE(S): CPT

## 2021-06-14 ENCOUNTER — HOSPITAL ENCOUNTER (OUTPATIENT)
Dept: RADIATION THERAPY | Age: 56
Discharge: HOME | End: 2021-06-14
Payer: COMMERCIAL

## 2021-06-14 PROCEDURE — 77417 THER RADIOLOGY PORT IMAGE(S): CPT

## 2021-06-14 PROCEDURE — 77336 RADIATION PHYSICS CONSULT: CPT

## 2021-06-14 PROCEDURE — 77412 RADIATION TX DELIVERY LVL 3: CPT

## 2021-06-15 ENCOUNTER — HOSPITAL ENCOUNTER (OUTPATIENT)
Dept: RADIATION THERAPY | Age: 56
Discharge: HOME | End: 2021-06-15
Payer: COMMERCIAL

## 2021-06-15 PROCEDURE — 77412 RADIATION TX DELIVERY LVL 3: CPT

## 2021-06-16 ENCOUNTER — HOSPITAL ENCOUNTER (OUTPATIENT)
Dept: RADIATION THERAPY | Age: 56
Discharge: HOME | End: 2021-06-16
Payer: COMMERCIAL

## 2021-06-16 PROCEDURE — 77412 RADIATION TX DELIVERY LVL 3: CPT

## 2021-06-16 PROCEDURE — 77417 THER RADIOLOGY PORT IMAGE(S): CPT

## 2021-06-17 ENCOUNTER — HOSPITAL ENCOUNTER (OUTPATIENT)
Dept: RADIATION THERAPY | Age: 56
Discharge: HOME | End: 2021-06-17
Payer: COMMERCIAL

## 2021-06-17 PROCEDURE — 77417 THER RADIOLOGY PORT IMAGE(S): CPT

## 2021-06-17 PROCEDURE — 77412 RADIATION TX DELIVERY LVL 3: CPT

## 2021-06-18 ENCOUNTER — HOSPITAL ENCOUNTER (OUTPATIENT)
Dept: RADIATION THERAPY | Age: 56
Discharge: HOME | End: 2021-06-18
Payer: COMMERCIAL

## 2021-06-18 PROCEDURE — 77417 THER RADIOLOGY PORT IMAGE(S): CPT

## 2021-06-18 PROCEDURE — 77412 RADIATION TX DELIVERY LVL 3: CPT

## 2021-06-21 ENCOUNTER — HOSPITAL ENCOUNTER (OUTPATIENT)
Dept: RADIATION THERAPY | Age: 56
Discharge: HOME | End: 2021-06-21
Payer: COMMERCIAL

## 2021-06-21 PROCEDURE — 77417 THER RADIOLOGY PORT IMAGE(S): CPT

## 2021-06-21 PROCEDURE — 77336 RADIATION PHYSICS CONSULT: CPT

## 2021-06-21 PROCEDURE — 77412 RADIATION TX DELIVERY LVL 3: CPT

## 2021-06-22 ENCOUNTER — HOSPITAL ENCOUNTER (OUTPATIENT)
Dept: RADIATION THERAPY | Age: 56
Discharge: HOME | End: 2021-06-22
Payer: COMMERCIAL

## 2021-06-22 PROCEDURE — 77412 RADIATION TX DELIVERY LVL 3: CPT

## 2021-06-22 PROCEDURE — 77417 THER RADIOLOGY PORT IMAGE(S): CPT

## 2021-06-23 ENCOUNTER — HOSPITAL ENCOUNTER (OUTPATIENT)
Dept: RADIATION THERAPY | Age: 56
Discharge: HOME | End: 2021-06-23
Payer: COMMERCIAL

## 2021-06-23 PROCEDURE — 77412 RADIATION TX DELIVERY LVL 3: CPT

## 2021-06-23 PROCEDURE — 77417 THER RADIOLOGY PORT IMAGE(S): CPT

## 2021-06-24 ENCOUNTER — TELEPHONE (OUTPATIENT)
Dept: ONCOLOGY | Age: 56
End: 2021-06-24

## 2021-06-24 ENCOUNTER — HOSPITAL ENCOUNTER (OUTPATIENT)
Dept: RADIATION THERAPY | Age: 56
Discharge: HOME | End: 2021-06-24
Payer: COMMERCIAL

## 2021-06-24 PROCEDURE — 77412 RADIATION TX DELIVERY LVL 3: CPT

## 2021-06-24 PROCEDURE — 77417 THER RADIOLOGY PORT IMAGE(S): CPT

## 2021-06-24 NOTE — TELEPHONE ENCOUNTER
Patient called and asked does Dr Alec Friend prefer to see her after her final radiation appointment or before? She is currently scheduled for an OV with [facility] on 6/29 and rad onc that same day after her appointment with Alec Friend.     # [phone]

## 2021-06-25 ENCOUNTER — HOSPITAL ENCOUNTER (OUTPATIENT)
Dept: RADIATION THERAPY | Age: 56
Discharge: HOME | End: 2021-06-25
Payer: COMMERCIAL

## 2021-06-25 PROCEDURE — 77412 RADIATION TX DELIVERY LVL 3: CPT

## 2021-06-25 PROCEDURE — 77417 THER RADIOLOGY PORT IMAGE(S): CPT

## 2021-06-28 ENCOUNTER — HOSPITAL ENCOUNTER (OUTPATIENT)
Dept: RADIATION THERAPY | Age: 56
Discharge: HOME | End: 2021-06-28
Payer: COMMERCIAL

## 2021-06-28 PROCEDURE — 77417 THER RADIOLOGY PORT IMAGE(S): CPT

## 2021-06-28 PROCEDURE — 77336 RADIATION PHYSICS CONSULT: CPT

## 2021-06-28 PROCEDURE — 77412 RADIATION TX DELIVERY LVL 3: CPT

## 2021-06-29 ENCOUNTER — HOSPITAL ENCOUNTER (OUTPATIENT)
Dept: RADIATION THERAPY | Age: 56
Discharge: HOME | End: 2021-06-29
Payer: COMMERCIAL

## 2021-06-29 PROCEDURE — 77417 THER RADIOLOGY PORT IMAGE(S): CPT

## 2021-06-29 PROCEDURE — 77412 RADIATION TX DELIVERY LVL 3: CPT

## 2021-07-15 ENCOUNTER — OFFICE VISIT (OUTPATIENT)
Dept: ONCOLOGY | Age: 56
End: 2021-07-15
Payer: COMMERCIAL

## 2021-07-15 VITALS
BODY MASS INDEX: 36.62 KG/M2 | SYSTOLIC BLOOD PRESSURE: 138 MMHG | HEIGHT: 68 IN | WEIGHT: 241.6 LBS | RESPIRATION RATE: 18 BRPM | OXYGEN SATURATION: 98 % | HEART RATE: 67 BPM | TEMPERATURE: 98.2 F | DIASTOLIC BLOOD PRESSURE: 87 MMHG

## 2021-07-15 DIAGNOSIS — C50.912 MALIGNANT NEOPLASM OF LEFT BREAST IN FEMALE, ESTROGEN RECEPTOR POSITIVE, UNSPECIFIED SITE OF BREAST (HCC): ICD-10-CM

## 2021-07-15 DIAGNOSIS — E88.09 AROMATASE DEFICIENCY: Primary | ICD-10-CM

## 2021-07-15 DIAGNOSIS — Z17.0 MALIGNANT NEOPLASM OF LEFT BREAST IN FEMALE, ESTROGEN RECEPTOR POSITIVE, UNSPECIFIED SITE OF BREAST (HCC): ICD-10-CM

## 2021-07-15 PROCEDURE — 99214 OFFICE O/P EST MOD 30 MIN: CPT | Performed by: INTERNAL MEDICINE

## 2021-07-15 RX ORDER — ANASTROZOLE 1 MG/1
1 TABLET ORAL DAILY
Qty: 30 TABLET | Refills: 5 | Status: SHIPPED | OUTPATIENT
Start: 2021-07-15 | End: 2022-02-02 | Stop reason: SDUPTHER

## 2021-07-15 NOTE — PROGRESS NOTES
Kumar President is a 60 y.o. female  Chief Complaint   Patient presents with    Follow-up    Breast Cancer     left invasive breast cancer     1. Have you been to the ER, urgent care clinic since your last visit? Hospitalized since your last visit? No    2. Have you seen or consulted any other health care providers outside of the [address] [place] [address] since your last visit? Include any pap smears or colon screening.   No

## 2021-07-15 NOTE — PATIENT INSTRUCTIONS
Anastrozole  ([id number]-zohl)  Trade/other name(s): Arimidex  Why would this drug be used? Anastrozole is used to treat breast cancer in women who have already gone through menopause and no longer have menstrual periods (postmenopausal women.)  How does this drug work? Anastrozole belongs to a group of drugs called aromatase inhibitors. It helps keep the body from making estrogen without affecting other hormones. Breast cancers that need estrogen to grow may stop growing or decrease in size. Before taking this medicine  Tell your doctor  If you are allergic to anything, including medicines, dyes, additives, or foods. If you have any serious medical conditions, such as high cholesterol, heart disease, or liver disease (including cirrhosis)  If you are pregnant, trying to get pregnant, or if there is any chance of pregnancy. This drug may cause birth defects if either the male or female is taking it at the time of conception or during pregnancy. Check with your doctor about what kinds of birth control can be used with this medicine. If you are breast-feeding. It is not known whether this drug passes into breast milk. If it does, it could harm the baby. If you think you might want to have children in the future. This drug has only been tested in women who have gone through menopause, and it may reduce fertility in those who have not. Talk with your doctor about the possible risk with this drug and options that may preserve your ability to have children. About any other prescription or over-the-counter medicines you are taking, including vitamins and herbs. In fact, keeping a written list of each of these medicines (including the doses of each and when you take them) with you in case of emergency may help prevent complications if you get sick. Interactions with other drugs  Medicines that contain estrogen may reduce the action of anastrozole. Tamoxifen may also reduce its effects.    Check with your doctor, nurse, or pharmacist about other medicines, vitamins, herbs, and supplements, and whether alcohol can cause problems with this medicine. Interactions with foods  No serious interactions with food are known at this time. Check with your doctor, nurse, or pharmacist about whether foods may be a problem. Tell all the doctors, dentists, nurses, and pharmacists you visit that you are taking this drug. How is this drug taken or given? Anastrozole is a pill and should be taken once a day, about the same time each day. It can be taken with or without food. The dose is the same for all adults. Take this drug exactly as your doctor tells you to. If you do not understand the instructions, your doctor or nurse can explain them to you. Store the medicine in a tightly closed container and away from children and pets. Precautions  It is important to keep taking this drug, even if you feel well. If you are bothered by side effects, talk to your doctor or nurse to find out if the problems are serious. Many side effects can be managed with help from your doctor. Very rarely, this drug may cause blood clots. This can take the form of clots in arms or legs (deep vein thrombosis), heart attack, stroke, or a blockage in the lungs. Call your doctor or nurse right away if you notice pain in your lower leg (calf), redness or swelling of your arm or leg, shortness of breath, chest pain, or trouble speaking or moving. Possible side effects  You will probably not have most of the following side effects, but if you have any talk to your doctor or nurse. They can help you understand the side effects and cope with them.    Common  weakness   lower energy level   Less common  headache   nausea   mild diarrhea   increased or decreased appetite   sweating   hot flashes   vaginal dryness   pain in bones and joints  increased osteoporosis (thinning bones)  higher risk of broken bones (fracture)  thinning hair  mood disturbances  Rare  blood clots with redness or mild swelling of arms, legs and ankles, pain in leg or calf, shortness of breath, pain in the chest, trouble moving or speaking*   allergic reaction with itchy welts, swelling mouth or throat, and trouble breathing  *See \"Precautions\" section for more detailed information. There are other side effects not listed above that can also occur in some patients. Tell your doctor or nurse if you develop these or any other problems. FDA approval  Yes  first approved in 1995   Disclaimer: This information does not cover all possible uses, actions, precautions, side effects, or interactions. It is not intended as medical advice, and should not be relied upon as a substitute for talking with your doctor, who is familiar with your medical needs.    Last Medical Review: 12/02/2009  Last Revised: 12/02/2009

## 2021-07-15 NOTE — PROGRESS NOTES
[facility] [place] [address] [address], [address]  W: [phone]  F: [phone]    Reason for Visit:   Niecy Rodriguez is a 60 y.o. female who is seen in consultation at the request of Dr. Mary Bowen for evaluation of Left Invasive breast cancer- NOS    Treatment History:   · 3/30/2021: Left Lumpectomy and SLN, R breast reduction mammoplasty- 15 mm Invasive carcinoma- NOS, grade 1, margins negative, ER 99% VT 99% HER2 wendy negative, Ki67 13%. 3 negative nodes . R breast- ADH  · 7/1/2021: Completed RT    History of Present Illness:   Patient is a 60 y.o. female seen for breast cancer. She had an abnormal mammogram in Jan 2021 which showed a Left breast abnormality at 9 [address] Clock. Biopsy showed Invasive mammary carcinoma. She then had an MRI that showed a mass 1.3 x 1.7 cm and no adenopathy. She had a Lumpectomy and SLN , Low risk mammaprint. Completed RT. Seen for follow up  Has some L breast skin burn. No fevers, chills, sweats  She is still sore under her L armpit  She has been taking meloxicam for psoriatic arthritis    Extensive FH of breast cancer reviewed from Dr. Prince Sauceda note and confirmed with the patient  Maternal grandmother had breast cancer in her 42's. Maternal aunt had breast cancer in her 42's. Sister - questionable breast cancer? Age 48  Niece breast breast cancer age 40  Paternal cousin had breast cancer age 61. Niece had ovarian cancer age 28.   Mother had cervical cancer age 22  Past Medical History:   Diagnosis Date    Back pain     Chronic pain     BACK/JOINT PAIN    Constipation     Dyspepsia and other specified disorders of function of stomach     Gallstones 4/28/2014    GERD (gastroesophageal reflux disease)     INTERMITTANT    Joint pain     Motion sickness     Nausea & vomiting       Past Surgical History:   Procedure Laterality Date    HX APPENDECTOMY  1985    HX BREAST LUMPECTOMY Left 3/30/2021    LEFT BREAST LUMPECTOMY AND LEFT BREAST MAGSEED performed by Marge Guido MD at [address] HX BREAST RECONSTRUCTION Bilateral 3/30/2021    LEFT BREAST RECONSTRUCION WITH BILATERAL BREAST REDUCTION. performed by Mya Meng MD at [address] HX CHOLECYSTECTOMY  5-15-14    alfred [id number]. Adelene Sis    HX COLONOSCOPY  2013    HX GYN  ,         HX HEENT      tonsillectomy    HX HEENT      SKIN GRAFT UPPER FRONT TEETH     HX ORTHOPAEDIC  2003    meniscus tear right knee    HX OTHER SURGICAL      removal of cyst from head    HX WISDOM TEETH EXTRACTION      OH BREAST SURGERY PROCEDURE UNLISTED Bilateral LAST YEARS    BREAST BIOPSIES      Social History     Tobacco Use    Smoking status: Never Smoker    Smokeless tobacco: Never Used   Substance Use Topics    Alcohol use: Yes     Comment: wine/beer 2 per month      Family History   Problem Relation Age of Onset   22 [facility] Arthritis-osteo Mother     Diabetes Mother     Hypertension Mother     Cancer Father         LUNG    Hypertension Father     High Cholesterol Father     Cancer Sister     Breast Cancer Other     Ovarian Cancer Other     Breast Cancer Maternal Grandmother     Cancer Maternal Aunt     High Cholesterol Brother     Other Daughter         MIXED CONNECTIVE TISSUE DISORDER     Anesth Problems Neg Hx      Current Outpatient Medications   Medication Sig    melatonin 5 mg cap capsule Take 10 mg by mouth nightly.  multivitamin (ONE A DAY) tablet Take 1 Tab by mouth daily.  meloxicam (MOBIC) 15 mg tablet Take 15 mg by mouth daily.  solifenacin (VESICARE) 5 mg tablet Take 5 mg by mouth daily.  OTHER 1 Tab by Other route daily. AMBEREN- SUPPLEMENT   Indications: post-menopausal symptoms (Patient not taking: Reported on 7/15/2021)     No current facility-administered medications for this visit.       No Known Allergies     Review of Systems: A complete review of systems was obtained, negative except as described above. Physical Exam:     Visit Vitals  /87 (BP 1 Location: Right arm, BP Patient Position: Sitting)   Pulse 67   Temp 98.2 °F (36.8 °C) (Temporal)   Resp 18   Ht 5' 8\" (1.727 m)   Wt 241 lb 9.6 oz (109.6 kg)   LMP 04/30/2014   SpO2 98%   BMI 36.74 kg/m²     ECOG PS: 0  General: No distress  Eyes: PERRLA, anicteric sclerae  Skin: No rashes, ecchymoses, or petechiae. Normal temperature, turgor, and texture. Psych: Alert, oriented, appropriate affect, normal judgment/insight    Results:     Lab Results   Component Value Date/Time    WBC 5.4 03/11/2021 02:38 PM    HGB 12.3 03/11/2021 02:38 PM    HCT 36.5 03/11/2021 02:38 PM    PLATELET 132 81/47/7001 02:38 PM    MCV 90.3 03/11/2021 02:38 PM    ABS. NEUTROPHILS 3.3 03/11/2021 02:38 PM     Lab Results   Component Value Date/Time    Sodium 139 05/12/2014 03:37 PM    Potassium 4.6 05/12/2014 03:37 PM    Chloride 107 05/12/2014 03:37 PM    CO2 29 05/12/2014 03:37 PM    Glucose 119 (H) 05/12/2014 03:37 PM    BUN 15 05/12/2014 03:37 PM    Creatinine 0.60 05/12/2014 03:37 PM    GFR est AA >60 05/12/2014 03:37 PM    GFR est non-AA >60 05/12/2014 03:37 PM    Calcium 8.9 05/12/2014 03:37 PM     No results found for: TBILI, ALT, AP, TP, ALB, GLOB      Records reviewed and summarized above. Pathology report(s) reviewed     FINAL PATHOLOGIC DIAGNOSIS   1. Left breast, lumpectomy:   Invasive ductal carcinoma with lobular features   Ductal carcinoma in situ associated with invasive tumor   Biopsy site   INVASIVE CARCINOMA OF THE BREAST: Resection   SPECIMEN   Procedure: Excision (less than total mastectomy)   Specimen Laterality: Left   TUMOR   Histologic Type:  Invasive carcinoma of no special type (ductal)   Glandular (Acinar) / Tubular Differentiation: Score 2   Nuclear Pleomorphism: Score 1   Mitotic Rate: Score 1   Overall Grade: Grade 1 (scores of 3, 4 or 5)   Tumor Size: 15 mm   Ductal Carcinoma In Situ (DCIS): Present   Architectural Patterns: Cribriform, Solid   Nuclear Grade: Grade I (low)   Necrosis: Not identified   Tumor Extent   Treatment Effect in the Breast: No known presurgical therapy   MARGINS   Invasive Carcinoma Margins: Uninvolved by invasive carcinoma   Distance from Closest Margin (Millimeters): 5 mm   Closest Margin(s): Lateral   DCIS Margins: Uninvolved by DCIS   Distance from Closest Margin (Millimeters): 7 mm   Closest Margin(s): Lateral   LYMPH NODES   Regional Lymph Nodes: Uninvolved by tumor cells   Total Number of Lymph Nodes Examined: 4   Number of [place] Nodes Examined: 4   HORMONE RECEPTOR, PROLIFERATION MARKER AND HER-2 ANALYSIS   Performed on outside biopsy [facility] (cc: [id number])   Estrogen receptor: Positive (99%)   Progesterone receptor: Positive (99%)   Ki-67: 13%   HER-2 IHC: Negative   PATHOLOGIC STAGE CLASSIFICATION (pTNM, AJCC 8th Edition)   Primary Tumor (pT): pT1c   Regional Lymph Nodes Modifier: (sn): [place] node(s) evaluated   Regional Lymph Nodes (pN): pN0   2. Left axillary sentinel lymph node #1, excision:   Two benign lymph nodes (0/2)   3. Left axillary sentinel node #2, excision:   One benign lymph node (0/1)   4. Left axillary sentinel node #3, excision:   One benign lymph node (0/1)   5. Left superior margin:   Negative for tumor   6. Left medial margin:   Negative for tumor   7. Right breast tissue, 548 g, mammoplasty:   Benign skin and mammary tissue with focal atypical ductal hyperplasia   8. Left breast tissue, 350 g, mammoplasty:   Benign mammary tissue with mild fibrocystic change       Radiology report(s) reviewed above. Assessment:   1) Left breast invasive breast cancer    S/P Lumpectomy and SLN 3/30/2021  [id number]-Stage IA  ER 99% MT 99% HER2 wendy negative  Grade 1   Negative margins  MyRisk negative  Low risk mammaprint  Completed RT 7/1/2021      Reviewed the excellent prognosis of stage I HR+ breast cancer. There is a persistent low risk of recurrence - over time.  Adjuvant endocrine therapy reduces recurrences by 30% and saves 1 in 3 lives. We discussed the side effects of Arimidex  Written instructions given    2) R BREAST ADH  See above    3) Strong FH of multiple malignancies  MYRISK reviewed and negative    4) Psychosocial   Coping well and comes with supportive     Plan:     · Start Arimidex daily for 5 years  · DEXA at baseline  · VD and Ca  · RTC 3 months      I appreciate the opportunity to participate in Ms. Austin riley.     Signed By: Richie Lam MD

## 2021-08-12 DIAGNOSIS — C50.912 MALIGNANT NEOPLASM OF LEFT BREAST IN FEMALE, ESTROGEN RECEPTOR POSITIVE, UNSPECIFIED SITE OF BREAST (HCC): Primary | ICD-10-CM

## 2021-08-12 DIAGNOSIS — Z17.0 MALIGNANT NEOPLASM OF LEFT BREAST IN FEMALE, ESTROGEN RECEPTOR POSITIVE, UNSPECIFIED SITE OF BREAST (HCC): Primary | ICD-10-CM

## 2021-08-17 ENCOUNTER — HOSPITAL ENCOUNTER (OUTPATIENT)
Dept: RADIATION THERAPY | Age: 56
Discharge: HOME | End: 2021-08-17

## 2021-08-23 ENCOUNTER — HOSPITAL ENCOUNTER (OUTPATIENT)
Dept: MAMMOGRAPHY | Age: 56
Discharge: HOME | End: 2021-08-23
Attending: REGISTERED NURSE
Payer: COMMERCIAL

## 2021-08-23 DIAGNOSIS — C50.912 MALIGNANT NEOPLASM OF LEFT BREAST IN FEMALE, ESTROGEN RECEPTOR POSITIVE, UNSPECIFIED SITE OF BREAST (HCC): ICD-10-CM

## 2021-08-23 DIAGNOSIS — Z17.0 MALIGNANT NEOPLASM OF LEFT BREAST IN FEMALE, ESTROGEN RECEPTOR POSITIVE, UNSPECIFIED SITE OF BREAST (HCC): ICD-10-CM

## 2021-08-23 PROCEDURE — 77080 DXA BONE DENSITY AXIAL: CPT

## 2021-11-23 ENCOUNTER — OFFICE VISIT (OUTPATIENT)
Dept: SURGERY | Age: 56
End: 2021-11-23
Payer: COMMERCIAL

## 2021-11-23 VITALS — HEART RATE: 66 BPM | SYSTOLIC BLOOD PRESSURE: 153 MMHG | DIASTOLIC BLOOD PRESSURE: 88 MMHG

## 2021-11-23 DIAGNOSIS — Z17.0 MALIGNANT NEOPLASM OF UPPER-INNER QUADRANT OF LEFT BREAST IN FEMALE, ESTROGEN RECEPTOR POSITIVE (HCC): Primary | ICD-10-CM

## 2021-11-23 DIAGNOSIS — Z92.3 S/P RADIATION THERAPY: ICD-10-CM

## 2021-11-23 DIAGNOSIS — Z85.3 HISTORY OF BREAST CANCER IN FEMALE: ICD-10-CM

## 2021-11-23 DIAGNOSIS — C50.212 MALIGNANT NEOPLASM OF UPPER-INNER QUADRANT OF LEFT BREAST IN FEMALE, ESTROGEN RECEPTOR POSITIVE (HCC): Primary | ICD-10-CM

## 2021-11-23 DIAGNOSIS — Z98.890 S/P LUMPECTOMY OF BREAST: ICD-10-CM

## 2021-11-23 PROCEDURE — 99213 OFFICE O/P EST LOW 20 MIN: CPT | Performed by: NURSE PRACTITIONER

## 2021-11-23 NOTE — PROGRESS NOTES
HISTORY OF PRESENT ILLNESS  Viridiana Shah is a 60 y.o. female. HPI ESTABLISHED patient here for follow up LEFT breast cancer. Denies breast mass, skin changes, nipple discharge and pain except in her LEFT axilla when extending her arm - 4/10.           Breast history -  Referring - Dr. Lucy Trevino - [address]   - LEFT breast asymmetry 9 OC biopsy revealed invasive mammary carcinoma, ductal subtype. Also LEFT breast mass 9 OC 8 cmfn biopsy was benign adenosis. 3/30/2021: Left Lumpectomy and SLNB, R breast reduction mammoplasty- 15 mm Invasive carcinoma- NOS, grade 1, margins negative, ER 99% NM 99% HER2 wendy negative, Ki67 13%. 3 negative nodes . R breast- ADH - Dr. Mack Harada and Dr. Sharmila Olvera  21 - completed XRT - Dr. Tess Asencio risk Mammaprint  2021 - started anastrozole - Dr. Kathi Sabillon       Family history -  Maternal grandmother had breast cancer in her 42's. Maternal aunt had breast cancer in her 42's. Sister - questionable breast cancer? Age 48  Niece breast breast cancer age 40  Paternal cousin had breast cancer age 61. Niece had ovarian cancer age 28. Mother had cervical cancer age 22. OB History    No obstetric history on file. Obstetric Comments   Menarche 11, LMP , # of children 2, age of 4st delivery 19, Hysterectomy/oophorectomy NO/NO, Breast bx YES, history of breast feeding YES, BCP YES, Hormone therapy NO             Past Surgical History:   Procedure Laterality Date    HX APPENDECTOMY      HX BREAST LUMPECTOMY Left 3/30/2021    LEFT BREAST LUMPECTOMY AND LEFT BREAST MAGSEED performed by Skip Holguin MD at [address] [place] [address] [address]kwy Bilateral 3/30/2021    LEFT BREAST RECONSTRUCION WITH BILATERAL BREAST REDUCTION.  performed by Key Chacon MD at [address] HX CHOLECYSTECTOMY  5-15-14    lap [id number]-GlDr. Mueller Signs    HX COLONOSCOPY      HX GYN  ,         HX HEENT      tonsillectomy    HX HEENT   SKIN GRAFT UPPER FRONT TEETH     HX ORTHOPAEDIC  2003    meniscus tear right knee    HX OTHER SURGICAL  1992    removal of cyst from head    HX WISDOM TEETH EXTRACTION      AR BREAST SURGERY PROCEDURE UNLISTED Bilateral LAST YEARS    BREAST BIOPSIES           ROS    Physical Exam  Constitutional:       Appearance: Normal appearance. Chest:   Breasts:      Right: No mass, nipple discharge, skin change (well healed surgical incisions from reduction), tenderness, axillary adenopathy or supraclavicular adenopathy. Left: Tenderness (tender in axilla on palpation) present. No mass, nipple discharge, skin change (well healed surgical incisions to breast and axilla), axillary adenopathy or supraclavicular adenopathy. Musculoskeletal:      Comments: FROM - UE x 2 - LEFT axilla - tight and tender with full extension   Lymphadenopathy:      Upper Body:      Right upper body: No supraclavicular or axillary adenopathy. Left upper body: No supraclavicular or axillary adenopathy. Neurological:      Mental Status: She is alert. Psychiatric:         Attention and Perception: Attention normal.         Mood and Affect: Mood normal.         Speech: Speech normal.         Behavior: Behavior normal.         Visit Vitals  BP (!) 153/88   Pulse 66   LMP 04/30/2014         ASSESSMENT and PLAN  Encounter Diagnoses   Name Primary?  Malignant neoplasm of upper-inner quadrant of left breast in female, estrogen receptor positive ([facility] [address] Yes    S/P lumpectomy of breast     S/P radiation therapy     History of breast cancer in female       Normal exam with no evidence of local recurrence. Tightness to LEFT axilla - will do stretches at home and is going to start working with a . If the area is  in 6 months, will refer to PT. [facility] at [facility] 11/2021 - LDmammogram 3D in 6 months - will schedule there. RTC here in 6 months or sooner PRN.  Will work to space out appointments from med onc and rad onc. Seeing med onc in 2/2022. She is comfortable with this plan. All questions answered and she stated understanding. Total time spent for this patient - 20 minutes.

## 2021-11-23 NOTE — PATIENT INSTRUCTIONS
Breast Cancer: Care Instructions  Your Care Instructions     Breast cancer occurs when abnormal cells grow out of control in the breast. These cancer cells can spread within the breast, to nearby lymph nodes and other tissues, and to other parts of the body. Being treated for cancer can weaken your body, and you may feel very tired. Get the rest your body needs so you can feel better. Finding out that you have cancer is scary. You may feel many emotions and may need some help coping. Seek out family, friends, and counselors for support. You also can do things at home to make yourself feel better while you go through treatment. Call the [employer] (2-126.620.8545) or visit its website at [employer] for more information. Follow-up care is a key part of your treatment and safety. Be sure to make and go to all appointments, and call your doctor if you are having problems. It's also a good idea to know your test results and keep a list of the medicines you take. How can you care for yourself at home? · Take your medicines exactly as prescribed. Call your doctor if you think you are having a problem with your medicine. You may get medicine for nausea and vomiting if you have these side effects. · Follow your doctor's instructions to relieve pain. Pain from cancer and surgery can almost always be controlled. Use pain medicine when you first notice pain, before it becomes severe. · Eat healthy food. If you do not feel like eating, try to eat food that has protein and extra calories to keep up your strength and prevent weight loss. Drink liquid meal replacements for extra calories and protein. Try to eat your main meal early. · Get some physical activity every day, but do not get too tired. Keep doing the hobbies you enjoy as your energy allows. · Do not smoke. Smoking can make your cancer worse. If you need help quitting, talk to your doctor about stop-smoking programs and medicines.  These can increase your chances of quitting for good. · Take steps to control your stress and workload. Learn relaxation techniques. ? Share your feelings. Stress and tension affect our emotions. By expressing your feelings to others, you may be able to understand and cope with them. ? Consider joining a support group. Talking about a problem with your spouse, a good friend, or other people with similar problems is a good way to reduce tension and stress. ? Express yourself through art. Try writing, crafts, dance, or art to relieve stress. Some dance, writing, or art groups may be available just for people who have cancer. ? Be kind to your body and mind. Getting enough sleep, eating a healthy diet, and taking time to do things you enjoy can contribute to an overall feeling of balance in your life and can help reduce stress. ? Get help if you need it. Discuss your concerns with your doctor or counselor. · If you are vomiting or have diarrhea:  ? Drink plenty of fluids to prevent dehydration. Choose water and other clear liquids. If you have kidney, heart, or liver disease and have to limit fluids, talk with your doctor before you increase the amount of fluids you drink. ? When you are able to eat, try clear soups, mild foods, and liquids until all symptoms are gone for 12 to 48 hours. Other good choices include dry toast, crackers, cooked cereal, and gelatin dessert, such as Jell-O.  · If you have not already done so, prepare a list of advance directives. Advance directives are instructions to your doctor and family members about what kind of care you want if you become unable to speak or express yourself. When should you call for help? Call 911 anytime you think you may need emergency care. For example, call if:    · You passed out (lost consciousness).    Call your doctor now or seek immediate medical care if:    · You have a fever.     · You have abnormal bleeding.     · You think you have an infection.     · You have new or worse pain.     · You have new symptoms, such as a cough, belly pain, vomiting, diarrhea, or a rash. Watch closely for changes in your health, and be sure to contact your doctor if:    · You are much more tired than usual.     · You have swollen glands in your armpits, groin, or neck.     · You do not get better as expected. Where can you learn more? Go to http://www.garcia.com/  Enter V321 in the search box to learn more about \"Breast Cancer: Care Instructions. \"  Current as of: December 17, 2020               Content Version: 13.0  © 3705-7211 [employer]. Care instructions adapted under license by [employer] (which disclaims liability or warranty for this information). If you have questions about a medical condition or this instruction, always ask your healthcare professional. [address] any warranty or liability for your use of this information. Breast Cancer: Care Instructions  Your Care Instructions     Breast cancer occurs when abnormal cells grow out of control in the breast. These cancer cells can spread within the breast, to nearby lymph nodes and other tissues, and to other parts of the body. Being treated for cancer can weaken your body, and you may feel very tired. Get the rest your body needs so you can feel better. Finding out that you have cancer is scary. You may feel many emotions and may need some help coping. Seek out family, friends, and counselors for support. You also can do things at home to make yourself feel better while you go through treatment. Call the [employer] (4-904.364.6213) or visit its website at [employer] for more information. Follow-up care is a key part of your treatment and safety. Be sure to make and go to all appointments, and call your doctor if you are having problems.  It's also a good idea to know your test results and keep a list of the medicines you take.  How can you care for yourself at home? · Take your medicines exactly as prescribed. Call your doctor if you think you are having a problem with your medicine. You may get medicine for nausea and vomiting if you have these side effects. · Follow your doctor's instructions to relieve pain. Pain from cancer and surgery can almost always be controlled. Use pain medicine when you first notice pain, before it becomes severe. · Eat healthy food. If you do not feel like eating, try to eat food that has protein and extra calories to keep up your strength and prevent weight loss. Drink liquid meal replacements for extra calories and protein. Try to eat your main meal early. · Get some physical activity every day, but do not get too tired. Keep doing the hobbies you enjoy as your energy allows. · Do not smoke. Smoking can make your cancer worse. If you need help quitting, talk to your doctor about stop-smoking programs and medicines. These can increase your chances of quitting for good. · Take steps to control your stress and workload. Learn relaxation techniques. ? Share your feelings. Stress and tension affect our emotions. By expressing your feelings to others, you may be able to understand and cope with them. ? Consider joining a support group. Talking about a problem with your spouse, a good friend, or other people with similar problems is a good way to reduce tension and stress. ? Express yourself through art. Try writing, crafts, dance, or art to relieve stress. Some dance, writing, or art groups may be available just for people who have cancer. ? Be kind to your body and mind. Getting enough sleep, eating a healthy diet, and taking time to do things you enjoy can contribute to an overall feeling of balance in your life and can help reduce stress. ? Get help if you need it. Discuss your concerns with your doctor or counselor.   · If you are vomiting or have diarrhea:  ? Drink plenty of fluids to prevent dehydration. Choose water and other clear liquids. If you have kidney, heart, or liver disease and have to limit fluids, talk with your doctor before you increase the amount of fluids you drink. ? When you are able to eat, try clear soups, mild foods, and liquids until all symptoms are gone for 12 to 48 hours. Other good choices include dry toast, crackers, cooked cereal, and gelatin dessert, such as Jell-O.  · If you have not already done so, prepare a list of advance directives. Advance directives are instructions to your doctor and family members about what kind of care you want if you become unable to speak or express yourself. When should you call for help? Call 911 anytime you think you may need emergency care. For example, call if:    · You passed out (lost consciousness). Call your doctor now or seek immediate medical care if:    · You have a fever.     · You have abnormal bleeding.     · You think you have an infection.     · You have new or worse pain.     · You have new symptoms, such as a cough, belly pain, vomiting, diarrhea, or a rash. Watch closely for changes in your health, and be sure to contact your doctor if:    · You are much more tired than usual.     · You have swollen glands in your armpits, groin, or neck.     · You do not get better as expected. Where can you learn more? Go to http://www.Contently.com/  Enter V321 in the search box to learn more about \"Breast Cancer: Care Instructions. \"  Current as of: December 17, 2020               Content Version: 13.0  © 1845-4950 [employer]. Care instructions adapted under license by [employer] (which disclaims liability or warranty for this information). If you have questions about a medical condition or this instruction, always ask your healthcare professional. [address] any warranty or liability for your use of this information.

## 2022-02-02 ENCOUNTER — OFFICE VISIT (OUTPATIENT)
Dept: ONCOLOGY | Age: 57
End: 2022-02-02
Payer: COMMERCIAL

## 2022-02-02 VITALS
SYSTOLIC BLOOD PRESSURE: 124 MMHG | WEIGHT: 249 LBS | TEMPERATURE: 98.3 F | OXYGEN SATURATION: 96 % | HEART RATE: 71 BPM | BODY MASS INDEX: 37.86 KG/M2 | DIASTOLIC BLOOD PRESSURE: 76 MMHG | RESPIRATION RATE: 18 BRPM

## 2022-02-02 DIAGNOSIS — E88.09 AROMATASE DEFICIENCY: Primary | ICD-10-CM

## 2022-02-02 DIAGNOSIS — Z17.0 MALIGNANT NEOPLASM OF LEFT BREAST IN FEMALE, ESTROGEN RECEPTOR POSITIVE, UNSPECIFIED SITE OF BREAST (HCC): ICD-10-CM

## 2022-02-02 DIAGNOSIS — C50.912 MALIGNANT NEOPLASM OF LEFT BREAST IN FEMALE, ESTROGEN RECEPTOR POSITIVE, UNSPECIFIED SITE OF BREAST (HCC): ICD-10-CM

## 2022-02-02 PROCEDURE — 99213 OFFICE O/P EST LOW 20 MIN: CPT | Performed by: INTERNAL MEDICINE

## 2022-02-02 RX ORDER — ANASTROZOLE 1 MG/1
1 TABLET ORAL DAILY
Qty: 30 TABLET | Refills: 5 | Status: SHIPPED | OUTPATIENT
Start: 2022-02-02 | End: 2022-08-25 | Stop reason: SDUPTHER

## 2022-02-02 NOTE — PROGRESS NOTES
Carmencita Sever is a 60 y.o. female    Chief Complaint   Patient presents with    Follow-up      Left Invasive breast cancer- NOS       1. Have you been to the ER, urgent care clinic since your last visit? Hospitalized since your last visit? No    2. Have you seen or consulted any other health care providers outside of the [address] [place] [address] since your last visit? Include any pap smears or colon screening.  Yes, GYN

## 2022-02-02 NOTE — PROGRESS NOTES
----- Message from Casandra Amaya MA sent at 6/26/2017  8:11 AM EDT -----  [facility]   Norah Rubalcava(grandmother)is coming in for an appt with Dr Aldana at 9am and would like her to  a copy of her immunization record .   [facility] at [address], [address] [place] [address] Road, [address] [place] [address]  W: [phone]  F: [phone]    Reason for Visit:   Darek Desouza is a 60 y.o. female who is seen in consultation at the request of Dr. Pérez Owen for evaluation of Left Invasive breast cancer- NOS    Treatment History:   · 3/30/2021: Left Lumpectomy and SLN, R breast reduction mammoplasty- 15 mm Invasive carcinoma- NOS, grade 1, margins negative, ER 99% OR 99% HER2 wendy negative, Ki67 13%. 3 negative nodes . R breast- ADH  · 7/1/2021: Completed RT    History of Present Illness:   Patient is a 60 y.o. female seen for breast cancer. She had an abnormal mammogram in Jan 2021 which showed a Left breast abnormality at 9 O Clock. Biopsy showed Invasive mammary carcinoma. She then had an MRI that showed a mass 1.3 x 1.7 cm and no adenopathy. She had a Lumpectomy and SLN , Low risk mammaprint. Completed RT. Started Arimidex 8/2021. She had some hot flashes which has improved. She has been taking meloxicam for psoriatic arthritis  She does have stable aches. She has no new cough, lumps, HA. She cant sleep much. Takes Melatonin. Extensive FH of breast cancer reviewed from Dr. Juan Velásquez note and confirmed with the patient  Maternal grandmother had breast cancer in her 42's. Maternal aunt had breast cancer in her 42's. Sister - questionable breast cancer? Age 48  Niece breast breast cancer age 40  Paternal cousin had breast cancer age 61. Niece had ovarian cancer age 28.   Mother had cervical cancer age 22  Past Medical History:   Diagnosis Date    Back pain     Chronic pain     BACK/JOINT PAIN    Constipation     Dyspepsia and other specified disorders of function of stomach     Gallstones 4/28/2014    GERD (gastroesophageal reflux disease)     INTERMITTANT    Joint pain     Motion sickness     Nausea & vomiting       Past Surgical History:   Procedure Laterality Date    HX APPENDECTOMY  1985    HX BREAST LUMPECTOMY Left 3/30/2021    LEFT BREAST LUMPECTOMY AND LEFT BREAST MAGSEED performed by Kate Pierce MD at [address] HX BREAST RECONSTRUCTION Bilateral 3/30/2021    LEFT BREAST RECONSTRUCION WITH BILATERAL BREAST REDUCTION. performed by Kelly Victor MD at [address] HX CHOLECYSTECTOMY  5-15-14    lap od[facility]m-[facility]-TrDr. Marion Yang    HX COLONOSCOPY      HX GYN  ,         HX HEENT      tonsillectomy    HX HEENT      SKIN GRAFT UPPER FRONT TEETH     HX ORTHOPAEDIC  2003    meniscus tear right knee    HX OTHER SURGICAL  1992    removal of cyst from head    HX WISDOM TEETH EXTRACTION      NM BREAST SURGERY PROCEDURE UNLISTED Bilateral LAST YEARS    BREAST BIOPSIES      Social History     Tobacco Use    Smoking status: Never Smoker    Smokeless tobacco: Never Used   Substance Use Topics    Alcohol use: Yes     Comment: wine/beer 2 per month      Family History   Problem Relation Age of Onset    OSTEOARTHRITIS Mother     Diabetes Mother     Hypertension Mother     Cancer Father         LUNG    Hypertension Father     High Cholesterol Father     Cancer Sister     Breast Cancer Other     Ovarian Cancer Other     Breast Cancer Maternal Grandmother     Cancer Maternal Aunt     High Cholesterol Brother     Other Daughter         MIXED CONNECTIVE TISSUE DISORDER     Anesth Problems Neg Hx      Current Outpatient Medications   Medication Sig    anastrozole (Arimidex) 1 mg tablet Take 1 mg by mouth daily.  melatonin 5 mg cap capsule Take 10 mg by mouth nightly.  multivitamin (ONE A DAY) tablet Take 1 Tab by mouth daily.  meloxicam (MOBIC) 15 mg tablet Take 15 mg by mouth daily.  solifenacin (VESICARE) 5 mg tablet Take 5 mg by mouth daily. No current facility-administered medications for this visit.       No Known Allergies     Review of Systems: A complete review of systems was obtained, negative except as described above.    Physical Exam:     Visit Vitals  LMP 04/30/2014     ECOG PS: 0  General: No distress  Eyes: PERRLA, anicteric sclerae  Breast: No palpable lumps or adenoapthy  Skin: No rashes, ecchymoses, or petechiae. Normal temperature, turgor, and texture. Psych: Alert, oriented, appropriate affect, normal judgment/insight    Results:     Lab Results   Component Value Date/Time    WBC 5.4 03/11/2021 02:38 PM    HGB 12.3 03/11/2021 02:38 PM    HCT 36.5 03/11/2021 02:38 PM    PLATELET 409 97/58/4372 02:38 PM    MCV 90.3 03/11/2021 02:38 PM    ABS. NEUTROPHILS 3.3 03/11/2021 02:38 PM     Lab Results   Component Value Date/Time    Sodium 139 05/12/2014 03:37 PM    Potassium 4.6 05/12/2014 03:37 PM    Chloride 107 05/12/2014 03:37 PM    CO2 29 05/12/2014 03:37 PM    Glucose 119 (H) 05/12/2014 03:37 PM    BUN 15 05/12/2014 03:37 PM    Creatinine 0.60 05/12/2014 03:37 PM    GFR est AA >60 05/12/2014 03:37 PM    GFR est non-AA >60 05/12/2014 03:37 PM    Calcium 8.9 05/12/2014 03:37 PM     No results found for: TBILI, ALT, AP, TP, ALB, GLOB      Records reviewed and summarized above. Pathology report(s) reviewed     FINAL PATHOLOGIC DIAGNOSIS   1. Left breast, lumpectomy:   Invasive ductal carcinoma with lobular features   Ductal carcinoma in situ associated with invasive tumor   Biopsy site   INVASIVE CARCINOMA OF THE BREAST: Resection   SPECIMEN   Procedure: Excision (less than total mastectomy)   Specimen Laterality: Left   TUMOR   Histologic Type:  Invasive carcinoma of no special type (ductal)   Glandular (Acinar) / Tubular Differentiation: Score 2   Nuclear Pleomorphism: Score 1   Mitotic Rate: Score 1   Overall Grade: Grade 1 (scores of 3, 4 or 5)   Tumor Size: 15 mm   Ductal Carcinoma In Situ (DCIS): Present   Architectural Patterns: Cribriform, Solid   Nuclear Grade: Grade I (low)   Necrosis: Not identified   Tumor Extent   Treatment Effect in the Breast: No known presurgical therapy   MARGINS   Invasive Carcinoma Margins: Uninvolved by invasive carcinoma   Distance from Closest Margin (Millimeters): 5 mm   Closest Margin(s): Lateral   DCIS Margins: Uninvolved by DCIS   Distance from Closest Margin (Millimeters): 7 mm   Closest Margin(s): Lateral   LYMPH NODES   Regional Lymph Nodes: Uninvolved by tumor cells   Total Number of Lymph Nodes Examined: 4   Number of Hilger Nodes Examined: 4   HORMONE RECEPTOR, PROLIFERATION MARKER AND HER-2 ANALYSIS   Performed on outside biopsy [id number] [facility] (cc: [id number])   Estrogen receptor: Positive (99%)   Progesterone receptor: Positive (99%)   Ki-67: 13%   HER-2 IHC: Negative   PATHOLOGIC STAGE CLASSIFICATION (pTNM, AJCC 8th Edition)   Primary Tumor (pT): pT1c   Regional Lymph Nodes Modifier: (sn): Hilger node(s) evaluated   Regional Lymph Nodes (pN): pN0   2. Left axillary sentinel lymph node #1, excision:   Two benign lymph nodes (0/2)   3. Left axillary sentinel node #2, excision:   One benign lymph node (0/1)   4. Left axillary sentinel node #3, excision:   One benign lymph node (0/1)   5. Left superior margin:   Negative for tumor   6. Left medial margin:   Negative for tumor   7. Right breast tissue, 548 g, mammoplasty:   Benign skin and mammary tissue with focal atypical ductal hyperplasia   8. Left breast tissue, 350 g, mammoplasty:   Benign mammary tissue with mild fibrocystic change       Radiology report(s) reviewed above.   DEXA 8/2021- Normal     Assessment:   1) Left breast invasive breast cancer    S/P Lumpectomy and SLN 3/30/2021  [id number]-Stage IA  ER 99% [place] 99% HER2 wendy negative  Grade 1   Negative margins  MyRisk negative  Low risk mammaprint  Completed RT 7/1/2021  Started Arimidex 8/2021  Tolerating well       No side effects  Exam reassuring      2) R BREAST ADH  See above    3) Strong FH of multiple malignancies  MYRISK reviewed and negative    4) Psychosocial   Coping well and comes with supportive     Plan:     · Start Arimidex daily for 5 years  · DEXA  Next 8/2023  · VD and Ca  · RTC in 1 year- she sees Dr. Marion Jordan in 6 months. I appreciate the opportunity to participate in Ms. Sreekanth Truong rosemary.     Signed By: Catherine Albright MD

## 2022-03-19 PROBLEM — E88.09 AROMATASE DEFICIENCY: Status: ACTIVE | Noted: 2021-07-15

## 2022-03-20 PROBLEM — C50.912 MALIGNANT NEOPLASM OF LEFT BREAST IN FEMALE, ESTROGEN RECEPTOR POSITIVE (HCC): Status: ACTIVE | Noted: 2021-04-28

## 2022-03-20 PROBLEM — Z17.0 MALIGNANT NEOPLASM OF LEFT BREAST IN FEMALE, ESTROGEN RECEPTOR POSITIVE (HCC): Status: ACTIVE | Noted: 2021-04-28

## 2022-05-23 NOTE — PROGRESS NOTES
HISTORY OF PRESENT ILLNESS  Mona Kumar is a 60 y.o. female. HPI ESTABLISHED patient here for follow up LEFT breast cancer.  Denies breast mass, skin changes, nipple discharge and pain.         Breast history -  Referring - Dr. Analy Lau - [address]   - LEFT breast asymmetry 9 OC biopsy revealed invasive mammary carcinoma, ductal subtype. Also LEFT breast mass 9 OC 8 cmfn biopsy was benign adenosis. 3/30/2021: Left Lumpectomy and SLNB, R breast reduction mammoplasty- 15 mm Invasive carcinoma- NOS, grade 1, margins negative, ER 99% [place] 99% HER2 wendy negative, Ki67 13%. 3 negative nodes . R breast- ADH - Dr. Brittany Wells and Dr. Grace Pastor  21 - completed XRT - Dr. Nahid Arellano risk Mammaprint  2021 - started anastrozole - Dr. Simran Escobar         Family history -  Maternal grandmother had breast cancer in her 42's. Maternal aunt had breast cancer in her 42's. Sister - questionable breast cancer? Age 48  Niece breast breast cancer age 40  Paternal cousin had breast cancer age 61. Niece had ovarian cancer age 28. Mother had cervical cancer age 22. OB History    No obstetric history on file. Obstetric Comments   Menarche 11, LMP 2017, # of children 2, age of 4st delivery 19, Hysterectomy/oophorectomy NO/NO, Breast bx YES, history of breast feeding YES, BCP YES, Hormone therapy NO               Past Surgical History:   Procedure Laterality Date    HX APPENDECTOMY      HX BREAST LUMPECTOMY Left 3/30/2021    LEFT BREAST LUMPECTOMY AND LEFT BREAST MAGSEED performed by Lucrecia Chi MD at [address] [place] [address] Pkwy Bilateral 3/30/2021    LEFT BREAST RECONSTRUCION WITH BILATERAL BREAST REDUCTION.  performed by Misty Law MD at [address] HX CHOLECYSTECTOMY  5-15-14    lap kbnxa-RSY-JzDr. Demario Tomas    HX COLONOSCOPY      HX GYN  ,         HX HEENT      tonsillectomy    HX HEENT      SKIN GRAFT UPPER FRONT TEETH     HX ORTHOPAEDIC   meniscus tear right knee    HX OTHER SURGICAL  1992    removal of cyst from head    HX WISDOM TEETH EXTRACTION      [place] BREAST SURGERY PROCEDURE UNLISTED Bilateral LAST YEARS    BREAST BIOPSIES         ROS    Physical Exam  Constitutional:       Appearance: Normal appearance. Chest:   Breasts:      Right: No mass, nipple discharge, skin change, tenderness, axillary adenopathy or supraclavicular adenopathy. Left: No mass, nipple discharge, skin change, tenderness, axillary adenopathy or supraclavicular adenopathy. Comments: Well healed surgical incisions bilaterally  Musculoskeletal:      Comments: FROM - UE x 2   Lymphadenopathy:      Upper Body:      Right upper body: No supraclavicular or axillary adenopathy. Left upper body: No supraclavicular or axillary adenopathy. Neurological:      Mental Status: She is alert. Psychiatric:         Attention and Perception: Attention normal.         Mood and Affect: Mood normal.         Speech: Speech normal.         Behavior: Behavior normal.         Visit Vitals  /77 (BP 1 Location: Right arm, BP Patient Position: Sitting, BP Cuff Size: Adult)   Pulse 67   Ht 5' 8\" (1.727 m)   Wt 244 lb 9.6 oz (110.9 kg)   LMP 04/30/2014   BMI 37.19 kg/m²       ASSESSMENT and PLAN    ICD-10-CM ICD-9-CM    1. Malignant neoplasm of upper-inner quadrant of left breast in female, estrogen receptor positive (HCC)  C50.212 174.2     Z17.0 V86.0    2. S/P lumpectomy of breast  Z98.890 V45.89    3. S/P radiation therapy  Z92.3 V66.1    4. History of breast cancer in female  Z85.3 V10.3         Normal exam with no evidence of local recurrence. LDmammogram 3D 5/5022 - BIRADS 2 - BDmammogram 3D in 6 months - will schedule there. Has follow-up with Dr. Román Rosenbaum and Dr. Hadley Brito. RTC here in 1 year or sooner PRN. This will space out appointments with med onc,rad onc and GYN. She is comfortable with this plan.   All questions answered and she stated understanding.                       Total time spent for this patient - 20 minutes.

## 2022-05-24 ENCOUNTER — OFFICE VISIT (OUTPATIENT)
Dept: SURGERY | Age: 57
End: 2022-05-24
Payer: COMMERCIAL

## 2022-05-24 VITALS
BODY MASS INDEX: 37.07 KG/M2 | HEIGHT: 68 IN | WEIGHT: 244.6 LBS | SYSTOLIC BLOOD PRESSURE: 136 MMHG | HEART RATE: 67 BPM | DIASTOLIC BLOOD PRESSURE: 77 MMHG

## 2022-05-24 DIAGNOSIS — C50.212 MALIGNANT NEOPLASM OF UPPER-INNER QUADRANT OF LEFT BREAST IN FEMALE, ESTROGEN RECEPTOR POSITIVE (HCC): Primary | ICD-10-CM

## 2022-05-24 DIAGNOSIS — Z98.890 S/P LUMPECTOMY OF BREAST: ICD-10-CM

## 2022-05-24 DIAGNOSIS — Z92.3 S/P RADIATION THERAPY: ICD-10-CM

## 2022-05-24 DIAGNOSIS — Z17.0 MALIGNANT NEOPLASM OF UPPER-INNER QUADRANT OF LEFT BREAST IN FEMALE, ESTROGEN RECEPTOR POSITIVE (HCC): Primary | ICD-10-CM

## 2022-05-24 DIAGNOSIS — Z85.3 HISTORY OF BREAST CANCER IN FEMALE: ICD-10-CM

## 2022-05-24 PROCEDURE — 99213 OFFICE O/P EST LOW 20 MIN: CPT | Performed by: NURSE PRACTITIONER

## 2022-05-24 RX ORDER — UREA 10 %
LOTION (ML) TOPICAL
COMMUNITY
Start: 2021-08-23

## 2022-05-24 NOTE — PATIENT INSTRUCTIONS

## 2022-07-22 ENCOUNTER — OFFICE VISIT (OUTPATIENT)
Dept: URGENT CARE | Age: 57
End: 2022-07-22
Payer: COMMERCIAL

## 2022-07-22 VITALS
TEMPERATURE: 98.3 F | RESPIRATION RATE: 16 BRPM | WEIGHT: 241 LBS | HEART RATE: 72 BPM | SYSTOLIC BLOOD PRESSURE: 135 MMHG | OXYGEN SATURATION: 98 % | BODY MASS INDEX: 36.53 KG/M2 | HEIGHT: 68 IN | DIASTOLIC BLOOD PRESSURE: 90 MMHG

## 2022-07-22 DIAGNOSIS — M54.50 ACUTE RIGHT-SIDED LOW BACK PAIN WITHOUT SCIATICA: Primary | ICD-10-CM

## 2022-07-22 LAB
BILIRUB UR QL STRIP: NEGATIVE
GLUCOSE UR-MCNC: NEGATIVE MG/DL
KETONES P FAST UR STRIP-MCNC: NEGATIVE MG/DL
PH UR STRIP: 5 [PH] (ref 4.6–8)
PROT UR QL STRIP: NEGATIVE
SP GR UR STRIP: 1.02 (ref 1–1.03)
UA UROBILINOGEN AMB POC: NORMAL (ref 0.2–1)
URINALYSIS CLARITY POC: NORMAL
URINALYSIS COLOR POC: NORMAL
URINE BLOOD POC: NEGATIVE
URINE LEUKOCYTES POC: NEGATIVE
URINE NITRITES POC: NEGATIVE

## 2022-07-22 PROCEDURE — S9083 URGENT CARE CENTER GLOBAL: HCPCS | Performed by: FAMILY MEDICINE

## 2022-07-22 PROCEDURE — 81003 URINALYSIS AUTO W/O SCOPE: CPT | Performed by: FAMILY MEDICINE

## 2022-07-22 RX ORDER — PREDNISONE 10 MG/1
TABLET ORAL
Qty: 21 TABLET | Refills: 0 | Status: SHIPPED | OUTPATIENT
Start: 2022-07-22

## 2022-07-22 NOTE — PROGRESS NOTES
Subjective: (As above and below)     The patient/guardian gave verbal consent to treat. Chief Complaint   Patient presents with    LOW BACK PAIN     Karen Whittington is a 60 y.o. female Pt here today with complaint of right sided low back pain since Saturday, 6 days now. Described as dull ache non radiating. Only hurts when she lays down. Reports some accompanying increased urinary frequency at nighttime. No burning, urgency , blood in urine, fever, chills or trouble pushing out urine. ROS  Review of Systems - negative except as listed above    Reviewed PmHx, RxHx, FmHx, SocHx, AllgHx and updated in chart.   Family History   Problem Relation Age of Onset    OSTEOARTHRITIS Mother     Diabetes Mother     Hypertension Mother     Cancer Father         LUNG    Hypertension Father     High Cholesterol Father     Cancer Sister     Breast Cancer Other     Ovarian Cancer Other     Breast Cancer Maternal Grandmother     Cancer Maternal Aunt     High Cholesterol Brother     Other Daughter         MIXED CONNECTIVE TISSUE DISORDER     Anesth Problems Neg Hx        Past Medical History:   Diagnosis Date    Back pain     Chronic pain     BACK/JOINT PAIN    Constipation     Dyspepsia and other specified disorders of function of stomach     Gallstones 4/28/2014    GERD (gastroesophageal reflux disease)     INTERMITTANT    Joint pain     Motion sickness     Nausea & vomiting       Social History     Socioeconomic History    Marital status:    Tobacco Use    Smoking status: Never    Smokeless tobacco: Never   Vaping Use    Vaping Use: Never used   Substance and Sexual Activity    Alcohol use: Yes     Comment: wine/beer 2 per month    Drug use: Never          Current Outpatient Medications   Medication Sig    predniSONE (STERAPRED DS) 10 mg dose pack Take per dose pack instructions    calcium carbonate (Calcium 500) 500 mg calcium (1,250 mg) chewable tablet     anastrozole (Arimidex) 1 mg tablet Take 1 mg by mouth daily.    melatonin 5 mg cap capsule Take 10 mg by mouth nightly. multivitamin (ONE A DAY) tablet Take 1 Tab by mouth daily. meloxicam (MOBIC) 15 mg tablet Take 15 mg by mouth daily. No current facility-administered medications for this visit. Objective:     Vitals:    07/22/22 1120   BP: (!) 135/90   Pulse: 72   Resp: 16   Temp: 98.3 °F (36.8 °C)   SpO2: 98%   Weight: 241 lb (109.3 kg)   Height: 5' 8\" (1.727 m)       Physical Exam  General appearance - appears well hydrated and does not appear toxic, no acute distress  Eyes - EOMs intact. Non injected. No scleral icterus   Ears - no external swelling. T  Nose -  No purulent drainage  Neck/Lymphatics - trachea midlin  Chest - Normal breathing effort no wheeze rales, rhonchi or diminishments bilaterally. Heart - RRR, no murmurs  Skin - no observable rashes or pallor  Neurologic- alert and oriented x 3  Psychiatric- normal mood, behavior and though content. MSK- no CVA tenderness bilat. No palpable TTP of back. No midline T or L spine tenderness. Full AROM without pain of torso. Toe touch and SLR negative for pain      Assessment/ Plan:     1. Acute right-sided low back pain without sciatica    - AMB POC URINALYSIS DIP STICK MANUAL W/O MICRO  - predniSONE (STERAPRED DS) 10 mg dose pack; Take per dose pack instructions  Dispense: 21 Tablet; Refill: 0    Muscular - flank or low back pain suspected  Ddx renal pain  UA non suggestive of UTI or stone.  Not having any obstructive symptoms  Warm compresses, OTC tylenol extra strength as directed      Test Results:  Recent Results (from the past 6 hour(s))   AMB POC URINALYSIS DIP STICK MANUAL W/O MICRO    Collection Time: 07/22/22 11:30 AM   Result Value Ref Range    Color (UA POC)      Clarity (UA POC)      Glucose (UA POC) Negative Negative    Bilirubin (UA POC) Negative Negative    Ketones (UA POC) Negative Negative    Specific gravity (UA POC) 1.025 1.001 - 1.035    Blood (UA POC) Negative Negative    pH (UA POC) 5.0 4.6 - 8.0    Protein (UA POC) Negative Negative    Urobilinogen (UA POC) 0.2 mg/dL 0.2 - 1    Nitrites (UA POC) Negative Negative    Leukocyte esterase (UA POC) Negative Negative       Follow up: Follow up immediately for any new, worsening or changes or if symptoms are not improving over the next 5-7 days.          Coco Joseph, NP

## 2022-07-22 NOTE — PATIENT INSTRUCTIONS
Please follow up with PCP within 1-2 weeks if pain does not resolve.   Go to ED/ER for any worsening or changes

## 2022-08-16 ENCOUNTER — HOSPITAL ENCOUNTER (OUTPATIENT)
Dept: RADIATION THERAPY | Age: 57
Discharge: HOME | End: 2022-08-16

## 2022-08-25 DIAGNOSIS — Z17.0 MALIGNANT NEOPLASM OF LEFT BREAST IN FEMALE, ESTROGEN RECEPTOR POSITIVE, UNSPECIFIED SITE OF BREAST (HCC): Primary | ICD-10-CM

## 2022-08-25 DIAGNOSIS — C50.912 MALIGNANT NEOPLASM OF LEFT BREAST IN FEMALE, ESTROGEN RECEPTOR POSITIVE, UNSPECIFIED SITE OF BREAST (HCC): Primary | ICD-10-CM

## 2022-08-25 RX ORDER — ANASTROZOLE 1 MG/1
1 TABLET ORAL DAILY
Qty: 90 TABLET | Refills: 1 | Status: SHIPPED | OUTPATIENT
Start: 2022-08-25

## 2022-08-25 NOTE — TELEPHONE ENCOUNTER
Oral Chemotherapy     Kumar Vieyra is a  59 y. o.female  diagnosed with breast cancer . Ms. Zaynab Harmon is being treated with anastrozole.      Medication name: anastrozole    Dose:  1 mg   Frequency: daily  Ordering provider: Zofia High MD  Start date: 8/2021        Last OV 2/22  Next OV 2/23    Refill for 6 months sent to [address] [place], PHARMD, [facility], [address] [place] [address] in place: Yes  Recommendation Provided To: Patient/Caregiver: 1 via Telephone  Intervention Detail: Refill(s) Provided    Intervention Accepted By: Patient/Caregiver: 1  Time Spent (min): 10

## 2022-08-29 RX ORDER — ANASTROZOLE 1 MG/1
TABLET ORAL
Qty: 90 TABLET | Refills: 3 | Status: SHIPPED | OUTPATIENT
Start: 2022-08-29

## 2022-11-08 NOTE — PROGRESS NOTES
Cancer [facility] at [place] [address] [address], [address]  W: [phone]  F: [phone]    Reason for Visit:   Jn Mejia is a 59 y.o. female who is seen for follow up of Left Invasive breast cancer- NOS    Treatment History:   3/30/2021: Left Lumpectomy and SLN, R breast reduction mammoplasty- 15 mm Invasive carcinoma- NOS, grade 1, margins negative, ER 99% RI 99% HER2 wendy negative, Ki67 13%. 3 negative nodes . R breast- ADH  7/1/2021: Completed RT  8/2021: Arimidex    History of Present Illness:   Patient is a 59 y.o. female seen for breast cancer. She had an abnormal mammogram in Jan 2021 which showed a Left breast abnormality at 9 O Clock. Biopsy showed Invasive mammary carcinoma. She then had an MRI that showed a mass 1.3 x 1.7 cm and no adenopathy. She had a Lumpectomy and SLN , Low risk mammaprint. Completed RT. Started Arimidex 8/2021. She thinks that she has more flare ups of psoriatic arthritis since she started AI. Was a runner and cant anymore. Knees and hips ache a lot. She has dysparunia. She has been taking meloxicam for psoriatic arthritis. Helps a little. She has a rare hot flash. No other complains. Extensive FH of breast cancer reviewed from Dr. Hunter Herman note and confirmed with the patient  Maternal grandmother had breast cancer in her 42's. Maternal aunt had breast cancer in her 42's. Sister - questionable breast cancer? Age 48  Niece breast breast cancer age 40  Paternal cousin had breast cancer age 61. Niece had ovarian cancer age 28.   Mother had cervical cancer age 22  Past Medical History:   Diagnosis Date    Back pain     Chronic pain     BACK/JOINT PAIN    Constipation     Dyspepsia and other specified disorders of function of stomach     Gallstones 4/28/2014    GERD (gastroesophageal reflux disease)     INTERMITTANT    Joint pain     Motion sickness     Nausea & vomiting       Past Surgical History:   Procedure Laterality Date    HX APPENDECTOMY  1985    HX BREAST LUMPECTOMY Left 3/30/2021    LEFT BREAST LUMPECTOMY AND LEFT BREAST MAGSEED performed by Tanesha Croft MD at [place] [address]    HX BREAST RECONSTRUCTION Bilateral 3/30/2021    LEFT BREAST RECONSTRUCION WITH BILATERAL BREAST REDUCTION. performed by Beti Thomas MD at [place] [address]    HX CHOLECYSTECTOMY  5-15-14    alfred cummnisnhqun-KLU-MwDr. Sindy Almanza    HX COLONOSCOPY      HX GYN  ,         HX HEENT      tonsillectomy    HX HEENT      SKIN GRAFT UPPER FRONT TEETH     HX ORTHOPAEDIC  2003    meniscus tear right knee    HX OTHER SURGICAL  1992    removal of cyst from head    HX WISDOM TEETH EXTRACTION      AR BREAST SURGERY PROCEDURE UNLISTED Bilateral LAST YEARS    BREAST BIOPSIES      Social History     Tobacco Use    Smoking status: Never    Smokeless tobacco: Never   Substance Use Topics    Alcohol use: Yes     Comment: wine/beer 2 per month      Family History   Problem Relation Age of Onset    OSTEOARTHRITIS Mother     Diabetes Mother     Hypertension Mother     Cancer Father         LUNG    Hypertension Father     High Cholesterol Father     Cancer Sister     Breast Cancer Other     Ovarian Cancer Other     Breast Cancer Maternal Grandmother     Cancer Maternal Aunt     High Cholesterol Brother     Other Daughter         MIXED CONNECTIVE TISSUE DISORDER     Anesth Problems Neg Hx      Current Outpatient Medications   Medication Sig    anastrozole (ARIMIDEX) 1 mg tablet TAKE 1 TABLET DAILY    anastrozole (Arimidex) 1 mg tablet Take 1 mg by mouth daily. predniSONE (STERAPRED DS) 10 mg dose pack Take per dose pack instructions    calcium carbonate (Calcium 500) 500 mg calcium (1,250 mg) chewable tablet     melatonin 5 mg cap capsule Take 10 mg by mouth nightly. multivitamin (ONE A DAY) tablet Take 1 Tab by mouth daily. meloxicam (MOBIC) 15 mg tablet Take 15 mg by mouth daily.      No current facility-administered medications for this visit. No Known Allergies     Review of Systems: A complete review of systems was obtained, negative except as described above. Physical Exam:     Visit Vitals  LMP 04/30/2014     ECOG PS: 0  General: No distress  Eyes: PERRLA, anicteric sclerae  Breast: deferred  Skin: No rashes, ecchymoses, or petechiae. Normal temperature, turgor, and texture. Psych: Alert, oriented, appropriate affect, normal judgment/insight    Results:     Lab Results   Component Value Date/Time    WBC 5.4 03/11/2021 02:38 PM    HGB 12.3 03/11/2021 02:38 PM    HCT 36.5 03/11/2021 02:38 PM    PLATELET 413 27/94/1494 02:38 PM    MCV 90.3 03/11/2021 02:38 PM    ABS. NEUTROPHILS 3.3 03/11/2021 02:38 PM     Lab Results   Component Value Date/Time    Sodium 139 05/12/2014 03:37 PM    Potassium 4.6 05/12/2014 03:37 PM    Chloride 107 05/12/2014 03:37 PM    CO2 29 05/12/2014 03:37 PM    Glucose 119 (H) 05/12/2014 03:37 PM    BUN 15 05/12/2014 03:37 PM    Creatinine 0.60 05/12/2014 03:37 PM    GFR est AA >60 05/12/2014 03:37 PM    GFR est non-AA >60 05/12/2014 03:37 PM    Calcium 8.9 05/12/2014 03:37 PM     No results found for: TBILI, ALT, AP, TP, ALB, GLOB      Records reviewed and summarized above. Pathology report(s) reviewed     FINAL PATHOLOGIC DIAGNOSIS   1. Left breast, lumpectomy:   Invasive ductal carcinoma with lobular features   Ductal carcinoma in situ associated with invasive tumor   Biopsy site   INVASIVE CARCINOMA OF THE BREAST: Resection   SPECIMEN   Procedure: Excision (less than total mastectomy)   Specimen Laterality: Left   TUMOR   Histologic Type:  Invasive carcinoma of no special type (ductal)   Glandular (Acinar) / Tubular Differentiation: Score 2   Nuclear Pleomorphism: Score 1   Mitotic Rate: Score 1   Overall Grade: Grade 1 (scores of 3, 4 or 5)   Tumor Size: 15 mm   Ductal Carcinoma In Situ (DCIS): Present   Architectural Patterns: Cribriform, Solid   Nuclear Grade: Grade I (low)   Necrosis: Not identified Tumor Extent   Treatment Effect in the Breast: No known presurgical therapy   MARGINS   Invasive Carcinoma Margins: Uninvolved by invasive carcinoma   Distance from Closest Margin (Millimeters): 5 mm   Closest Margin(s): Lateral   DCIS Margins: Uninvolved by DCIS   Distance from Closest Margin (Millimeters): 7 mm   Closest Margin(s): Lateral   LYMPH NODES   Regional Lymph Nodes: Uninvolved by tumor cells   Total Number of Lymph Nodes Examined: 4   Number of [place] Nodes Examined: 4   HORMONE RECEPTOR, PROLIFERATION MARKER AND HER-2 ANALYSIS   Performed on outside biopsy [id number] [facility] (cc: [id number])   Estrogen receptor: Positive (99%)   Progesterone receptor: Positive (99%)   Ki-67: 13%   HER-2 IHC: Negative   PATHOLOGIC STAGE CLASSIFICATION (pTNM, AJCC 8th Edition)   Primary Tumor (pT): pT1c   Regional Lymph Nodes Modifier: (sn): [place] node(s) evaluated   Regional Lymph Nodes (pN): pN0   2. Left axillary sentinel lymph node #1, excision:   Two benign lymph nodes (0/2)   3. Left axillary sentinel node #2, excision:   One benign lymph node (0/1)   4. Left axillary sentinel node #3, excision:   One benign lymph node (0/1)   5. Left superior margin:   Negative for tumor   6. Left medial margin:   Negative for tumor   7. Right breast tissue, 548 g, mammoplasty:   Benign skin and mammary tissue with focal atypical ductal hyperplasia   8. Left breast tissue, 350 g, mammoplasty:   Benign mammary tissue with mild fibrocystic change       Radiology report(s) reviewed above.   DEXA 8/2021- Normal     Assessment:   1) Left breast invasive breast cancer    S/P Lumpectomy and SLN 3/30/2021  pT1cN0M0-Stage IA  ER 99% DE 99% HER2 wendy negative  Grade 1   Negative margins  MyRisk negative  Low risk mammaprint  Completed RT 7/1/2021  Started Arimidex 8/2021  Has severe arthralgias  Discussed a 2 week break and then switching to letrozole  If this causes arthralgias then consider aromasin      No side effects  Exam reassuring      2) R BREAST ADH  See above    3) Strong FH of multiple malignancies  MYRISK reviewed and negative    4) Psoriatic arthritis  She was given names of a rheumatologist    Plan:     Switch to letrozole  DEXA  Next 8/2023  VD and Ca  Mammogram 11/2022  Discuss Intra Nadya Jamison with your Gynecologist  RTC in 1 year- she sees Dr. Kathryn Shaffer in 6 months. I appreciate the opportunity to participate in Ms. Xander riley.     Signed By: Des Birmingham MD

## 2022-11-09 ENCOUNTER — OFFICE VISIT (OUTPATIENT)
Dept: ONCOLOGY | Age: 57
End: 2022-11-09
Payer: COMMERCIAL

## 2022-11-09 VITALS
DIASTOLIC BLOOD PRESSURE: 83 MMHG | SYSTOLIC BLOOD PRESSURE: 127 MMHG | BODY MASS INDEX: 37.38 KG/M2 | HEART RATE: 100 BPM | OXYGEN SATURATION: 98 % | WEIGHT: 246.6 LBS | TEMPERATURE: 96.1 F | HEIGHT: 68 IN

## 2022-11-09 DIAGNOSIS — C50.912 MALIGNANT NEOPLASM OF LEFT BREAST IN FEMALE, ESTROGEN RECEPTOR POSITIVE, UNSPECIFIED SITE OF BREAST (HCC): Primary | ICD-10-CM

## 2022-11-09 DIAGNOSIS — Z17.0 MALIGNANT NEOPLASM OF LEFT BREAST IN FEMALE, ESTROGEN RECEPTOR POSITIVE, UNSPECIFIED SITE OF BREAST (HCC): Primary | ICD-10-CM

## 2022-11-09 PROCEDURE — 99214 OFFICE O/P EST MOD 30 MIN: CPT | Performed by: INTERNAL MEDICINE

## 2022-11-09 RX ORDER — LETROZOLE 2.5 MG/1
2.5 TABLET, FILM COATED ORAL DAILY
Qty: 30 TABLET | Refills: 3 | Status: SHIPPED | OUTPATIENT
Start: 2022-11-09

## 2022-11-09 NOTE — PROGRESS NOTES
Yu Beck is a 59 y.o. female  Chief Complaint   Patient presents with    Follow-up     Left Invasive breast cancer- NOS     1. Have you been to the ER, urgent care clinic since your last visit? Hospitalized since your last visit? No    2. Have you seen or consulted any other health care providers outside of the [address] [place] [address] since your last visit? Include any pap smears or colon screening. No    Patient states she has been feeling aching in her bones all over and 7 out of 10. Patient states she feels this pain mainly on her right side of her body.

## 2023-01-03 DIAGNOSIS — C50.912 MALIGNANT NEOPLASM OF LEFT BREAST IN FEMALE, ESTROGEN RECEPTOR POSITIVE, UNSPECIFIED SITE OF BREAST (HCC): Primary | ICD-10-CM

## 2023-01-03 DIAGNOSIS — Z17.0 MALIGNANT NEOPLASM OF LEFT BREAST IN FEMALE, ESTROGEN RECEPTOR POSITIVE, UNSPECIFIED SITE OF BREAST (HCC): Primary | ICD-10-CM

## 2023-01-04 RX ORDER — LETROZOLE 2.5 MG/1
2.5 TABLET, FILM COATED ORAL DAILY
Qty: 90 TABLET | Refills: 2 | Status: SHIPPED | OUTPATIENT
Start: 2023-01-04

## 2023-01-04 NOTE — TELEPHONE ENCOUNTER
Oral Chemotherapy     Codie Atkins is a  59 y. o.female  diagnosed with breast cancer . Ms. Joyce Buchanan is being treated with letrozole. Medication name: letrozole    Dose:  2.5 mg   Frequency: daily    Ordering provider: Emanuel Spear MD  Start date: 8/2021        Last OV 11/9/2022  Switched from anastrozole to letrozole due to joint aches/flare up of psoriatic arthritis.      Refill for letrozole changed to 90 day supply per patient request.      Latha Huynh, PHARMD, BCOP, BCPS    For Pharmacy Admin Tracking Only    Program: Medical Group  CPA in place: Yes  Recommendation Provided To: Patient/Caregiver: 1 via Telephone  Intervention Detail: Refill(s) Provided  Intervention Accepted By: Patient/Caregiver: 1    Time Spent (min): 10

## 2023-05-23 ENCOUNTER — OFFICE VISIT (OUTPATIENT)
Age: 58
End: 2023-05-23
Payer: COMMERCIAL

## 2023-05-23 VITALS — WEIGHT: 246 LBS | HEIGHT: 68 IN | BODY MASS INDEX: 37.28 KG/M2

## 2023-05-23 DIAGNOSIS — Z17.0 MALIGNANT NEOPLASM OF UPPER-INNER QUADRANT OF LEFT BREAST IN FEMALE, ESTROGEN RECEPTOR POSITIVE (HCC): Primary | ICD-10-CM

## 2023-05-23 DIAGNOSIS — Z85.3 HISTORY OF BREAST CANCER IN FEMALE: ICD-10-CM

## 2023-05-23 DIAGNOSIS — Z98.890 S/P LUMPECTOMY OF BREAST: ICD-10-CM

## 2023-05-23 DIAGNOSIS — Z92.3 S/P RADIATION THERAPY: ICD-10-CM

## 2023-05-23 DIAGNOSIS — C50.212 MALIGNANT NEOPLASM OF UPPER-INNER QUADRANT OF LEFT BREAST IN FEMALE, ESTROGEN RECEPTOR POSITIVE (HCC): Primary | ICD-10-CM

## 2023-05-23 PROCEDURE — 99213 OFFICE O/P EST LOW 20 MIN: CPT | Performed by: NURSE PRACTITIONER

## 2023-05-23 RX ORDER — CLOBETASOL PROPIONATE 0.5 MG/G
OINTMENT TOPICAL
COMMUNITY
Start: 2023-04-04

## 2023-05-23 RX ORDER — SOLIFENACIN SUCCINATE 5 MG/1
TABLET, FILM COATED ORAL
COMMUNITY

## 2023-05-23 RX ORDER — MELOXICAM 15 MG/1
TABLET ORAL
COMMUNITY

## 2023-05-23 RX ORDER — PRASTERONE 6.5 MG/1
INSERT VAGINAL
COMMUNITY
Start: 2022-12-16

## 2023-05-23 RX ORDER — LETROZOLE 2.5 MG/1
TABLET, FILM COATED ORAL
COMMUNITY
Start: 2022-11-09

## 2023-05-23 RX ORDER — UREA 10 %
LOTION (ML) TOPICAL
COMMUNITY
Start: 2021-08-23

## 2023-05-23 NOTE — PROGRESS NOTES
HISTORY OF PRESENT ILLNESS  Juan R Quinones is a 59 y.o. female     HPI ESTABLISHED patient here for follow up LEFT breast cancer. Denies breast mass, skin changes, nipple discharge and pain. Breast history -  Referring - Dr. Lala Valenzuela - [address]   - LEFT breast asymmetry 9 OC biopsy revealed invasive mammary carcinoma, ductal subtype. Also LEFT breast mass 9 OC 8 cmfn biopsy was benign adenosis. 3/30/2021 - LEFT Lumpectomy and SLNB, R breast reduction mammoplasty- 15 mm Invasive carcinoma- NOS, grade 1, margins negative, ER 99% WY 99% HER2 jeremie negative, Ki67 13%. 3 negative nodes . R breast- ADH - Dr. Libby Quintana and Dr. Segundo Gallo  21 - completed XRT - Dr. Gayla Renee risk Mammaprint  2021 - started anastrozole - Dr. Wilfredo Trevino           Family history -  Maternal grandmother had breast cancer in her 42's. Maternal aunt had breast cancer in her 42's. Sister - questionable breast cancer? Age 48  Niece breast breast cancer age 40  Paternal cousin had breast cancer age 61. Niece had ovarian cancer age 28. Mother had cervical cancer age 22.   - [employer] testing - clinically negative with VUS         OB History    No obstetric history on file. Obstetric Comments   Menarche 11, LMP , # of children 2, age of 4st delivery 19, Hysterectomy/oophorectomy NO/NO, Breast bx YES, history of breast feeding YES, BCP YES, Hormone therapy NO               Past Surgical History:   Procedure Laterality Date    APPENDECTOMY  1985    BREAST LUMPECTOMY Left 3/30/2021    LEFT BREAST LUMPECTOMY AND LEFT BREAST MAGSEED performed by Agustín Rodriguez MD at [address] [place] Bilateral 3/30/2021    LEFT BREAST RECONSTRUCION WITH BILATERAL BREAST REDUCTION.  performed by Lena Kennedy MD at [id number] Bilateral LAST YEARS    BREAST BIOPSIES    CHOLECYSTECTOMY  5-15-14    lap eowlk-NAG-KmDr. Gabriela Ellison    COLONOSCOPY      [address] [facility]        HEENT

## 2023-09-07 ENCOUNTER — HOSPITAL ENCOUNTER (OUTPATIENT)
Facility: HOSPITAL | Age: 58
Discharge: HOME | End: 2023-09-10

## 2023-09-07 VITALS — DIASTOLIC BLOOD PRESSURE: 85 MMHG | HEART RATE: 89 BPM | SYSTOLIC BLOOD PRESSURE: 125 MMHG

## 2023-09-07 NOTE — PROGRESS NOTES
increased work of breathing. Symmetric chest rise  Breast: bilateral reductions, well healed, no masses or concerning findings. Skin: Warm, dry, no rashes noted. Neurologic: Alert and oriented. Psychiatric: Cooperative to commands and responds to questions appropriately. Assessment & Plan   Ms. Elvira Joseph is a 59 y.o. female with a history of E+P+H- grade 1 left breast T1cN0 IDC. Low risk mammaprint. 3/30/21 left lumpectomy with bilateral reduction mammoplasty with negative margins. She completed 4256cGy to the left breast on 6/29/21 and opted to forego a cavity boost.     Doing well on Letrozole      Plan:  will alternate seeing Dr. Jessica Walker and Js Lopez and return to see me only as-needed. I wish her all the best, and she knows to call or return any time with additional questions or concerns. -     Mindy Krishna MD  [facility]  [address]  P: [address] [place] [address]  P: 20 [facility]  [address] [facility], [address]   P: [phone]    Time and Counseling: I spent a total of 25 minutes in care of this patient during today's encounter on 9/7/23.     Carbon Copy:  Alison Carrel, MD

## 2023-09-12 DIAGNOSIS — C50.912 MALIGNANT NEOPLASM OF LEFT BREAST IN FEMALE, ESTROGEN RECEPTOR POSITIVE, UNSPECIFIED SITE OF BREAST (HCC): Primary | ICD-10-CM

## 2023-09-12 DIAGNOSIS — Z17.0 MALIGNANT NEOPLASM OF LEFT BREAST IN FEMALE, ESTROGEN RECEPTOR POSITIVE, UNSPECIFIED SITE OF BREAST (HCC): Primary | ICD-10-CM

## 2023-09-12 RX ORDER — LETROZOLE 2.5 MG/1
TABLET, FILM COATED ORAL
Qty: 90 TABLET | Refills: 3 | Status: SHIPPED | OUTPATIENT
Start: 2023-09-12

## 2023-09-12 NOTE — TELEPHONE ENCOUNTER
Oral Hormone therapy     Avinash Florez is a 59 y. o.female diagnosed with breast cancer . Ms. Jon Roberson is being treated with letrozole.      Medication name: letrozole    Dose: 2.5 mg   Frequency: daily    Ordering provider: Francisco Pierce MD  Start date: 8/2021      Last OV 11/9/2022  Next OV 11/9/2023    Refill for letrozole     Guido Santos, KARTHIKEYAND, BCOP, [address] [facility] Only    Program: Medical Group  CPA in place:  Yes  Recommendation Provided To: Patient/Caregiver: 1 via Telephone  Intervention Detail: Refill(s) Provided  Intervention Accepted By: Patient/Caregiver: 1    Time Spent (min): 10

## 2023-11-09 ENCOUNTER — OFFICE VISIT (OUTPATIENT)
Age: 58
End: 2023-11-09
Payer: COMMERCIAL

## 2023-11-09 VITALS
RESPIRATION RATE: 18 BRPM | OXYGEN SATURATION: 96 % | HEIGHT: 68 IN | WEIGHT: 243 LBS | SYSTOLIC BLOOD PRESSURE: 128 MMHG | DIASTOLIC BLOOD PRESSURE: 87 MMHG | BODY MASS INDEX: 36.83 KG/M2 | TEMPERATURE: 98.1 F | HEART RATE: 72 BPM

## 2023-11-09 DIAGNOSIS — Z17.0 MALIGNANT NEOPLASM OF LEFT BREAST IN FEMALE, ESTROGEN RECEPTOR POSITIVE, UNSPECIFIED SITE OF BREAST (HCC): ICD-10-CM

## 2023-11-09 DIAGNOSIS — C50.912 MALIGNANT NEOPLASM OF LEFT BREAST IN FEMALE, ESTROGEN RECEPTOR POSITIVE, UNSPECIFIED SITE OF BREAST (HCC): ICD-10-CM

## 2023-11-09 PROCEDURE — 99213 OFFICE O/P EST LOW 20 MIN: CPT | Performed by: INTERNAL MEDICINE

## 2023-11-09 RX ORDER — LETROZOLE 2.5 MG/1
2.5 TABLET, FILM COATED ORAL DAILY
Qty: 90 TABLET | Refills: 3 | Status: SHIPPED | OUTPATIENT
Start: 2023-11-09

## 2023-11-09 RX ORDER — LEVOTHYROXINE SODIUM 0.03 MG/1
TABLET ORAL
COMMUNITY
Start: 2023-10-20

## 2023-11-09 ASSESSMENT — PATIENT HEALTH QUESTIONNAIRE - PHQ9
SUM OF ALL RESPONSES TO PHQ QUESTIONS 1-9: 0
SUM OF ALL RESPONSES TO PHQ9 QUESTIONS 1 & 2: 0
SUM OF ALL RESPONSES TO PHQ QUESTIONS 1-9: 0
SUM OF ALL RESPONSES TO PHQ QUESTIONS 1-9: 0
2. FEELING DOWN, DEPRESSED OR HOPELESS: 0
SUM OF ALL RESPONSES TO PHQ QUESTIONS 1-9: 0
1. LITTLE INTEREST OR PLEASURE IN DOING THINGS: 0

## 2023-11-15 ENCOUNTER — HOSPITAL ENCOUNTER (OUTPATIENT)
Facility: HOSPITAL | Age: 58
Discharge: HOME | End: 2023-11-18
Attending: INTERNAL MEDICINE
Payer: COMMERCIAL

## 2023-11-15 VITALS — WEIGHT: 235 LBS | BODY MASS INDEX: 37.77 KG/M2 | HEIGHT: 66 IN

## 2023-11-15 DIAGNOSIS — C50.912 MALIGNANT NEOPLASM OF LEFT BREAST IN FEMALE, ESTROGEN RECEPTOR POSITIVE, UNSPECIFIED SITE OF BREAST (HCC): ICD-10-CM

## 2023-11-15 DIAGNOSIS — Z17.0 MALIGNANT NEOPLASM OF LEFT BREAST IN FEMALE, ESTROGEN RECEPTOR POSITIVE, UNSPECIFIED SITE OF BREAST (HCC): ICD-10-CM

## 2023-11-15 PROCEDURE — 77080 DXA BONE DENSITY AXIAL: CPT

## 2024-05-23 ENCOUNTER — OFFICE VISIT (OUTPATIENT)
Age: 59
End: 2024-05-23
Payer: COMMERCIAL

## 2024-05-23 VITALS — WEIGHT: 220 LBS | BODY MASS INDEX: 35.36 KG/M2 | HEIGHT: 66 IN

## 2024-05-23 DIAGNOSIS — Z92.3 S/P RADIATION THERAPY: ICD-10-CM

## 2024-05-23 DIAGNOSIS — Z17.0 MALIGNANT NEOPLASM OF UPPER-INNER QUADRANT OF LEFT BREAST IN FEMALE, ESTROGEN RECEPTOR POSITIVE (HCC): Primary | ICD-10-CM

## 2024-05-23 DIAGNOSIS — Z85.3 HISTORY OF BREAST CANCER IN FEMALE: ICD-10-CM

## 2024-05-23 DIAGNOSIS — C50.212 MALIGNANT NEOPLASM OF UPPER-INNER QUADRANT OF LEFT BREAST IN FEMALE, ESTROGEN RECEPTOR POSITIVE (HCC): Primary | ICD-10-CM

## 2024-05-23 DIAGNOSIS — Z98.890 S/P LUMPECTOMY OF BREAST: ICD-10-CM

## 2024-05-23 PROCEDURE — 99213 OFFICE O/P EST LOW 20 MIN: CPT | Performed by: NURSE PRACTITIONER

## 2024-05-23 NOTE — PROGRESS NOTES
HISTORY OF PRESENT ILLNESS  Zandra Pickard is a 58 y.o. female     HPI ESTABLISHED patient here for annual f/u visit pertaining to LEFT breast cancer.        Breast history -  Referring - Dr. Kyle - [facility]  1/72021 - LEFT breast asymmetry 9 OC biopsy revealed invasive mammary carcinoma, ductal subtype. Also LEFT breast mass 9 OC 8 cmfn biopsy was benign adenosis.   3/30/2021 - LEFT Lumpectomy and SLNB, R breast reduction mammoplasty- 15 mm Invasive carcinoma- NOS, grade 1, margins negative, ER 99% ND 99% HER2 jeremie negative, Ki67 13%. 3 negative nodes . R breast- ADH - Dr. Taylor and Dr. Penaloza  7/1/21 - completed XRT - Dr. Montanez  Low risk Mammaprint  7/2021 - started anastrozole - Dr. Diallo            Family history -  Maternal grandmother had breast cancer in her 40's.  Maternal aunt had breast cancer in her 40's.  Sister - questionable breast cancer? Age 53  Niece breast breast cancer age 37  Paternal cousin had breast cancer age 60.  Niece had ovarian cancer age 35.  Mother had cervical cancer age 25.  2021 - [employer] testing - clinically negative with VUS             Review of Systems      Physical Exam       ASSESSMENT and PLAN  {Assessment and Plan Chronic Disease:[id number]}

## 2024-05-23 NOTE — PROGRESS NOTES
HISTORY OF PRESENT ILLNESS  Zandra Pickard is a 58 y.o. female     HPI  ESTABLISHED patient here for follow up LEFT breast cancer.  Denies breast mass, skin changes, nipple discharge and pain.             Breast history -  Referring - Dr. Kyle - [facility]   - LEFT breast asymmetry 9 OC biopsy revealed invasive mammary carcinoma, ductal subtype. Also LEFT breast mass 9 OC 8 cmfn biopsy was benign adenosis.   3/30/2021 - LEFT Lumpectomy and SLNB, R breast reduction mammoplasty- 15 mm Invasive carcinoma- NOS, grade 1, margins negative, ER 99% WY 99% HER2 jeremie negative, Ki67 13%. 3 negative nodes . R breast- ADH - Dr. Taylor and Dr. Penaloza  21 - completed XRT - Dr. Montanez  Low risk Mammaprint  2021 - started anastrozole - Dr. Diallo            Family history -  Maternal grandmother had breast cancer in her 40's.  Maternal aunt had breast cancer in her 40's.  Sister - questionable breast cancer? Age 53  Niece breast breast cancer age 37  Paternal cousin had breast cancer age 60.  Niece had ovarian cancer age 35.  Mother had cervical cancer age 25.   - [employer] testing - clinically negative with VUS         OB History    No obstetric history on file.      Obstetric Comments   Menarche 13, LMP 2017, # of children 2, age of 1st delivery 21, Hysterectomy/oophorectomy NO/NO, Breast bx YES, history of breast feeding YES, BCP YES, Hormone therapy NO                   Past Surgical History:   Procedure Laterality Date   • APPENDECTOMY     • BREAST LUMPECTOMY Left 3/30/2021    LEFT BREAST LUMPECTOMY AND LEFT BREAST MAGSEED performed by Minda Taylor MD at [facility] AMBULATORY OR   • BREAST RECONSTRUCTION Bilateral 3/30/2021    LEFT BREAST RECONSTRUCION WITH BILATERAL BREAST REDUCTION. performed by Julianne Penaloza MD at [facility] AMBULATORY OR   • BREAST SURGERY Bilateral LAST YEARS    BREAST BIOPSIES   •  SECTION   &    • CHOLECYSTECTOMY  -15-14    lap welit-CIA-XsDr. BI Call   • COLONOSCOPY

## 2024-09-29 DIAGNOSIS — C50.912 MALIGNANT NEOPLASM OF LEFT BREAST IN FEMALE, ESTROGEN RECEPTOR POSITIVE, UNSPECIFIED SITE OF BREAST (HCC): ICD-10-CM

## 2024-09-29 DIAGNOSIS — Z17.0 MALIGNANT NEOPLASM OF LEFT BREAST IN FEMALE, ESTROGEN RECEPTOR POSITIVE, UNSPECIFIED SITE OF BREAST (HCC): ICD-10-CM

## 2024-09-30 RX ORDER — LETROZOLE 2.5 MG/1
TABLET, FILM COATED ORAL
Qty: 90 TABLET | Refills: 2 | Status: SHIPPED | OUTPATIENT
Start: 2024-09-30

## 2025-02-03 NOTE — PROGRESS NOTES
Cancer [place] at [facility]   [address] [facility], [address] [place] [address]   W: [phone]  F: [phone]       Reason for Visit:   Zandra Pickard is a 59 y.o.  female who is seen for follow up of Left Invasive breast cancer- NOS        Treatment History:   3/30/2021: Left Lumpectomy and SLN, R breast reduction mammoplasty- 15 mm Invasive carcinoma- NOS, grade 1, margins negative, ER 99% OR  99% HER2 jeremie negative, Ki67 13%. 3 negative nodes . R breast- ADH  7/1/2021: Completed RT  8/2021: Arimidex (severe arthralgias), switched to Letrozole           History of Present Illness:     Patient is a 59 y.o. female seen for breast cancer.      She had an abnormal mammogram in Jan 2021 which showed a Left breast abnormality at 9 O Clock. Biopsy showed Invasive mammary carcinoma. She then had an MRI that showed a mass 1.3 x 1.7 cm and no adenopathy. She had a Lumpectomy and SLN , Low risk mammaprint.  Completed RT. Started Arimidex 8/2021. Did not tolerate it, switch to Letrozole.     Feeling well. Had her R knee  She has vaginal dryness- uses hormone free cream.  Has hot flashes mostly at night. Has mild joint pains that she uses Meloxican for.   Denies new breast pain, nipple drainage, or lumps or bumps.        Extensive FH of breast cancer reviewed from Dr. Martinez note and confirmed with the patient   Maternal grandmother had breast cancer in her 40's.   Maternal aunt had breast cancer in her 40's.   Sister - questionable breast cancer? Age 53   Niece breast breast cancer age 37   Paternal cousin had breast cancer age 60.   Niece had ovarian cancer age 35.   Mother had cervical cancer age 25       Review of Systems: A complete review of systems was obtained, negative except as described above.          Physical Exam:   Visit Vitals  /85 (Site: Right Upper Arm, Position: Sitting)   Pulse 78   Temp 98.1 °F (36.7 °C)   Resp 16   Wt 106.6 kg (235 lb)   SpO2 96%   BMI 37.93 kg/m²

## 2025-02-04 ENCOUNTER — OFFICE VISIT (OUTPATIENT)
Age: 60
End: 2025-02-04
Payer: COMMERCIAL

## 2025-02-04 VITALS
DIASTOLIC BLOOD PRESSURE: 85 MMHG | TEMPERATURE: 98.1 F | HEART RATE: 78 BPM | WEIGHT: 235 LBS | RESPIRATION RATE: 16 BRPM | SYSTOLIC BLOOD PRESSURE: 130 MMHG | BODY MASS INDEX: 37.93 KG/M2 | OXYGEN SATURATION: 96 %

## 2025-02-04 DIAGNOSIS — Z17.0 MALIGNANT NEOPLASM OF LEFT BREAST IN FEMALE, ESTROGEN RECEPTOR POSITIVE, UNSPECIFIED SITE OF BREAST (HCC): ICD-10-CM

## 2025-02-04 DIAGNOSIS — E88.09 AROMATASE DEFICIENCY: Primary | ICD-10-CM

## 2025-02-04 DIAGNOSIS — C50.912 MALIGNANT NEOPLASM OF LEFT BREAST IN FEMALE, ESTROGEN RECEPTOR POSITIVE, UNSPECIFIED SITE OF BREAST (HCC): ICD-10-CM

## 2025-02-04 PROCEDURE — 99213 OFFICE O/P EST LOW 20 MIN: CPT

## 2025-02-04 NOTE — PROGRESS NOTES
Zandra Pickard is a 59 y.o. female    Chief Complaint   Patient presents with    Follow-up     Left Invasive breast cancer- NOS       1. Have you been to the ER, urgent care clinic since your last visit?  Hospitalized since your last visit?No    2. Have you seen or consulted any other health care providers outside of the [facility] since your last visit?  Include any pap smears or colon screening. Yes, Full knee replacement in Nov 2024

## 2025-05-27 ENCOUNTER — OFFICE VISIT (OUTPATIENT)
Age: 60
End: 2025-05-27
Payer: COMMERCIAL

## 2025-05-27 VITALS — WEIGHT: 235 LBS | HEIGHT: 66 IN | BODY MASS INDEX: 37.77 KG/M2

## 2025-05-27 DIAGNOSIS — Z17.0 MALIGNANT NEOPLASM OF UPPER-INNER QUADRANT OF LEFT BREAST IN FEMALE, ESTROGEN RECEPTOR POSITIVE (HCC): Primary | ICD-10-CM

## 2025-05-27 DIAGNOSIS — C50.212 MALIGNANT NEOPLASM OF UPPER-INNER QUADRANT OF LEFT BREAST IN FEMALE, ESTROGEN RECEPTOR POSITIVE (HCC): Primary | ICD-10-CM

## 2025-05-27 DIAGNOSIS — Z98.890 S/P LUMPECTOMY OF BREAST: ICD-10-CM

## 2025-05-27 DIAGNOSIS — Z92.3 S/P RADIATION THERAPY: ICD-10-CM

## 2025-05-27 DIAGNOSIS — Z85.3 HISTORY OF BREAST CANCER IN FEMALE: ICD-10-CM

## 2025-05-27 PROCEDURE — 99213 OFFICE O/P EST LOW 20 MIN: CPT | Performed by: NURSE PRACTITIONER

## 2025-05-27 NOTE — PROGRESS NOTES
HISTORY OF PRESENT ILLNESS  Zandra Pickard is a 59 y.o. female     HPI  ESTABLISHED patient here for follow up LEFT breast cancer.  Denies breast mass, skin changes, nipple discharge and pain.             Breast history -  Referring - Dr. Kyle - [facility]   - LEFT breast asymmetry 9 OC biopsy revealed invasive mammary carcinoma, ductal subtype. Also LEFT breast mass 9 OC 8 cmfn biopsy was benign adenosis.   3/30/2021 - LEFT Lumpectomy and SLNB, R breast reduction mammoplasty- 15 mm Invasive carcinoma- NOS, grade 1, margins negative, ER 99% VT 99% HER2 jeremie negative, Ki67 13%. 3 negative nodes . R breast- [facility] - Dr. Taylor and Dr. Penaloza  21 - completed XRT - Dr. Montanez  Low risk Mammaprint  2021 - started anastrozole - Dr. Diallo   Switched to letrozole            Family history -  Maternal grandmother had breast cancer in her 40's.  Maternal aunt had breast cancer in her 40's.  Sister - questionable breast cancer? Age 53  Niece breast breast cancer age 37  Paternal cousin had breast cancer age 60.  Niece had ovarian cancer age 35.  Mother had cervical cancer age 25.   - [employer] testing - clinically negative with VUS       OB History    No obstetric history on file.      Obstetric Comments   Menarche 13, LMP 2017, # of children 2, age of 1st delivery 21, Hysterectomy/oophorectomy NO/NO, Breast bx YES, history of breast feeding YES, BCP YES, Hormone therapy NO                   Past Surgical History:   Procedure Laterality Date    APPENDECTOMY      BREAST LUMPECTOMY Left 3/30/2021    LEFT BREAST LUMPECTOMY AND LEFT BREAST MAGSEED performed by Minda Taylor MD at [facility] AMBUL[facility]ORY OR    BREAST RECONSTRUCTION Bilateral 3/30/2021    LEFT BREAST RECONSTRUCION WITH BILATERAL BREAST REDUCTION. performed by Julianne Penaloza MD at [facility] AMBULATORY OR    BREAST SURGERY Bilateral LAST YEARS    BREAST BIOPSIES     SECTION   &     CHOLECYSTECTOMY  5-15-14    lap [id number]. BI Call

## (undated) DEVICE — DRESSING,GAUZE,XEROFORM,CURAD,5"X9",ST: Brand: CURAD

## (undated) DEVICE — SPONGE GZ W4XL4IN COT 12 PLY TYP VII WVN C FLD DSGN

## (undated) DEVICE — SPONGE LAP 18X18IN STRL -- 5/PK

## (undated) DEVICE — INTENDED FOR TISSUE SEPARATION, AND OTHER PROCEDURES THAT REQUIRE A SHARP SURGICAL BLADE TO PUNCTURE OR CUT.: Brand: BARD-PARKER ® CARBON RIB-BACK BLADES

## (undated) DEVICE — NEEDLE HYPO 22GA L1.5IN BLK S STL HUB POLYPR SHLD REG BVL

## (undated) DEVICE — BRA COMPR MAMM SILKY 3XL BGE

## (undated) DEVICE — SUTURE PDS II SZ 3-0 L27IN ABSRB VLT L26MM SH 1/2 CIR Z316H

## (undated) DEVICE — TOWEL SURG W17XL27IN STD BLU COT NONFENESTRATED PREWASHED

## (undated) DEVICE — Device

## (undated) DEVICE — HANDLE LT SNAP ON ULT DURABLE LENS FOR TRUMPF ALC DISPOSABLE

## (undated) DEVICE — SUTURE MCRYL SZ 4-0 L27IN ABSRB UD L19MM PS-2 1/2 CIR PRIM Y426H

## (undated) DEVICE — NEEDLE HYPO 25GA L1.5IN BVL ORIENTED ECLIPSE

## (undated) DEVICE — DRAPE,CHEST,FENES,15X10,STERIL: Brand: MEDLINE

## (undated) DEVICE — INFECTION CONTROL KIT SYS

## (undated) DEVICE — Z DISCONTINUED NO SUB IDED GLOVE SURG SZ 6 L12IN FNGR THK13MIL WHT ISOLEX POLYISOPRENE

## (undated) DEVICE — TRAY PREP DRY W/ PREM GLV 2 APPL 6 SPNG 2 UNDPD 1 OVERWRAP

## (undated) DEVICE — SOLUTION SCRB 2OZ 10% POVIDONE IOD ANTIMIC BTL

## (undated) DEVICE — GARMENT,MEDLINE,DVT,INT,CALF,MED, GEN2: Brand: MEDLINE

## (undated) DEVICE — DERMABOND SKIN ADH 0.7ML -- DERMABOND ADVANCED 12/BX

## (undated) DEVICE — COLLECTION SET 21GA 1.25IN --

## (undated) DEVICE — DRAIN SURG 15FR L3/16IN SIL RND 3/4 FLUT 3/16IN TRCR

## (undated) DEVICE — PROBE DETECTION F/LUMPECTOMY -- ORDER IN MULTIPLES OF 5 EA ..MARGINPROBE

## (undated) DEVICE — TUBING, SUCTION, 1/4" X 12', STRAIGHT: Brand: MEDLINE

## (undated) DEVICE — CURVED, SMALL JAW, OPEN SEALER/DIVIDER: Brand: LIGASURE

## (undated) DEVICE — RETRACTOR 50-101-1 RADIALUX US: Brand: RADIALUX™

## (undated) DEVICE — DRAPE,REIN 53X77,STERILE: Brand: MEDLINE

## (undated) DEVICE — REM POLYHESIVE ADULT PATIENT RETURN ELECTRODE: Brand: VALLEYLAB

## (undated) DEVICE — RESERVOIR,SUCTION,100CC,SILICONE: Brand: MEDLINE

## (undated) DEVICE — PACK,BASIC,SIRUS,V: Brand: MEDLINE

## (undated) DEVICE — WRAP SURG W1.31XL1.34M CARD FOR PT 165-172CM THERMOWRP

## (undated) DEVICE — SURGICAL PROCEDURE PACK BASIN MAJ SET CUST NO CAUT

## (undated) DEVICE — INSULATED BLADE ELECTRODE: Brand: EDGE

## (undated) DEVICE — PENCIL SMK EVAC L10FT DIA95MM TBNG NONSTICK W ADPT TO 22MM

## (undated) DEVICE — 1010 S-DRAPE TOWEL DRAPE 10/BX: Brand: STERI-DRAPE™

## (undated) DEVICE — TAPE ADH W1INXL10YD PLAS TRNSPAR H2O RESIST HYPOALRG CURAD

## (undated) DEVICE — COVER US PRB W12XL244CM FLD IORT STR TIP

## (undated) DEVICE — STERILE POLYISOPRENE POWDER-FREE SURGICAL GLOVES WITH EMOLLIENT COATING: Brand: PROTEXIS

## (undated) DEVICE — SENSOR TEMP SKIN DISP

## (undated) DEVICE — SUTURE MCRYL SZ 3-0 L27IN ABSRB UD L24MM PS-1 3/8 CIR PRIM Y936H

## (undated) DEVICE — SOL IRR SOD CL 0.9% 1000ML BTL --

## (undated) DEVICE — SYR 10ML LUER LOK 1/5ML GRAD --

## (undated) DEVICE — SUT SLK 2-0SH 30IN BLK --